# Patient Record
Sex: FEMALE | Race: WHITE | Employment: OTHER | URBAN - METROPOLITAN AREA
[De-identification: names, ages, dates, MRNs, and addresses within clinical notes are randomized per-mention and may not be internally consistent; named-entity substitution may affect disease eponyms.]

---

## 2012-04-30 LAB — COLONOSCOPY, EXTERNAL: NORMAL

## 2015-12-01 LAB — MAMMOGRAPHY, EXTERNAL: NORMAL

## 2018-06-14 ENCOUNTER — HOSPITAL ENCOUNTER (OUTPATIENT)
Dept: GENERAL RADIOLOGY | Age: 58
Discharge: HOME | End: 2018-06-14
Payer: MEDICARE

## 2018-06-14 ENCOUNTER — OFFICE VISIT (OUTPATIENT)
Dept: NEUROLOGY | Age: 58
End: 2018-06-14

## 2018-06-14 VITALS
HEIGHT: 69 IN | SYSTOLIC BLOOD PRESSURE: 136 MMHG | OXYGEN SATURATION: 98 % | DIASTOLIC BLOOD PRESSURE: 66 MMHG | BODY MASS INDEX: 34.66 KG/M2 | WEIGHT: 234 LBS | HEART RATE: 78 BPM

## 2018-06-14 DIAGNOSIS — G43.109 MIGRAINE WITH AURA AND WITHOUT STATUS MIGRAINOSUS, NOT INTRACTABLE: ICD-10-CM

## 2018-06-14 DIAGNOSIS — G44.209 TENSION VASCULAR HEADACHE: Primary | ICD-10-CM

## 2018-06-14 DIAGNOSIS — G44.209 TENSION VASCULAR HEADACHE: ICD-10-CM

## 2018-06-14 DIAGNOSIS — G56.22 ULNAR NEUROPATHY AT ELBOW OF LEFT UPPER EXTREMITY: ICD-10-CM

## 2018-06-14 DIAGNOSIS — G56.21 ULNAR NEUROPATHY AT ELBOW OF RIGHT UPPER EXTREMITY: ICD-10-CM

## 2018-06-14 DIAGNOSIS — M47.22 CERVICAL RADICULOPATHY DUE TO DEGENERATIVE JOINT DISEASE OF SPINE: ICD-10-CM

## 2018-06-14 PROCEDURE — 72052 X-RAY EXAM NECK SPINE 6/>VWS: CPT

## 2018-06-14 RX ORDER — ALPRAZOLAM 1 MG/1
1 TABLET ORAL
Refills: 0 | COMMUNITY
Start: 2018-03-09 | End: 2021-07-07

## 2018-06-14 RX ORDER — CHOLECALCIFEROL (VITAMIN D3) 125 MCG
CAPSULE ORAL DAILY
COMMUNITY
End: 2021-07-07

## 2018-06-14 RX ORDER — BUTALBITAL, ACETAMINOPHEN AND CAFFEINE 50; 325; 40 MG/1; MG/1; MG/1
2 TABLET ORAL
Qty: 50 TAB | Refills: 11 | Status: SHIPPED | OUTPATIENT
Start: 2018-06-14 | End: 2019-05-21 | Stop reason: SDUPTHER

## 2018-06-14 RX ORDER — GABAPENTIN 400 MG/1
400 CAPSULE ORAL 3 TIMES DAILY
COMMUNITY
Start: 2018-04-23 | End: 2019-10-09 | Stop reason: SDUPTHER

## 2018-06-14 RX ORDER — BUPROPION HYDROCHLORIDE 300 MG/1
TABLET ORAL
Refills: 0 | COMMUNITY
Start: 2018-04-20 | End: 2021-07-06 | Stop reason: SDUPTHER

## 2018-06-14 RX ORDER — SIMVASTATIN 20 MG/1
20 TABLET, FILM COATED ORAL
COMMUNITY
Start: 2018-04-02 | End: 2021-07-07

## 2018-06-14 RX ORDER — LANOLIN ALCOHOL/MO/W.PET/CERES
CREAM (GRAM) TOPICAL
COMMUNITY
End: 2021-07-07

## 2018-06-14 RX ORDER — PANTOPRAZOLE SODIUM 40 MG/1
40 TABLET, DELAYED RELEASE ORAL DAILY
Refills: 0 | COMMUNITY
Start: 2018-04-07 | End: 2019-10-09 | Stop reason: ALTCHOICE

## 2018-06-14 RX ORDER — METHYLPHENIDATE HYDROCHLORIDE 10 MG/1
10 TABLET ORAL DAILY
Refills: 0 | COMMUNITY
Start: 2018-05-21 | End: 2021-07-07

## 2018-06-14 RX ORDER — ESCITALOPRAM OXALATE 20 MG/1
TABLET ORAL
COMMUNITY
End: 2021-06-21 | Stop reason: SDUPTHER

## 2018-06-14 RX ORDER — ZOLPIDEM TARTRATE 10 MG/1
10 TABLET ORAL
Refills: 0 | COMMUNITY
Start: 2018-05-16 | End: 2021-07-07

## 2018-06-14 NOTE — MR AVS SNAPSHOT
[address], 
[address], [address] [facility] 
[phone] Patient: Caitie Gimenez MRN: [id number] ELISA:[id number] Visit Information Date & Time Provider Department Dept. Phone Encounter #  
 6/14/2018 10:00 AM Lisha Hernandez MD [facility] [phone] [id number] Follow-up Instructions Return in about 6 months (around 12/14/2018). Upcoming Health Maintenance Date Due Hepatitis C Screening 1960 DTaP/Tdap/Td series (1 - Tdap) 3/28/1981 PAP AKA CERVICAL CYTOLOGY 3/28/1981 BREAST CANCER SCRN MAMMOGRAM 3/28/2010 FOBT Q 1 YEAR AGE 50-75 3/28/2010 MEDICARE YEARLY EXAM 5/10/2018 Influenza Age 5 to Adult 8/1/2018 Allergies as of 6/14/2018  Review Complete On: 6/14/2018 By: Lisha Hernandez MD  
  
 Severity Noted Reaction Type Reactions Clindamycin Hcl  05/07/2013    Diarrhea Penicillins  05/07/2013    Other (comments) Body aches Prednisone  05/07/2013    Other (comments)  
 hyper Sulfa (Sulfonamide Antibiotics)  06/14/2018    Unknown (comments) Current Immunizations  Never Reviewed No immunizations on file. Not reviewed this visit You Were Diagnosed With   
  
 Codes Comments Tension vascular headache    -  Primary ICD-10-CM: O56.820 ICD-9-CM: 307.81 Cervical radiculopathy due to degenerative joint disease of spine     ICD-10-CM: M47.22 
ICD-9-CM: 721.0 Ulnar neuropathy at elbow of right upper extremity     ICD-10-CM: G56.21 ICD-9-CM: 354.2 Ulnar neuropathy at elbow of left upper extremity     ICD-10-CM: G56.22 
ICD-9-CM: 354.2 Migraine with aura and without status migrainosus, not intractable     ICD-10-CM: G43.109 ICD-9-CM: 346.00 Vitals BP Pulse Height(growth percentile) Weight(growth percentile) SpO2 BMI  
 136/66 78 5' 9\" (1.753 m) 234 lb (106.1 kg) 98% 34.56 kg/m2 Smoking Status Never Smoker BMI and BSA Data Body Mass Index Body Surface Area 34.56 kg/m 2 2.27 m 2 Preferred Pharmacy Pharmacy Name Phone [id number] [address] [phone] Your Updated Medication List  
  
   
This list is accurate as of 6/14/18 10:51 AM.  Always use your most recent med list.  
  
  
  
  
 ALPRAZolam 1 mg tablet Commonly known as:  XANAX  
2 mg daily. buPROPion  mg XL tablet Commonly known as:  WELLBUTRIN XL  
take 1 tablet by mouth once daily  
  
 butalbital-acetaminophen-caffeine -40 mg per tablet Commonly known as:  Ludwig Devoid Take 2 Tabs by mouth every eight (8) hours as needed for Pain or Headache. Indications: Migraine, TENSION-TYPE HEADACHE  
  
 escitalopram oxalate 20 mg tablet Commonly known as:  Verneta Danny escitalopram 20 mg tablet daily  
  
 ferrous sulfate 325 mg (65 mg iron) tablet Take  by mouth Daily (before breakfast). FIBER (DEXTRIN) PO Take 3 mg by mouth three (3) times daily. gabapentin 400 mg capsule Commonly known as:  NEURONTIN  
400 mg three (3) times daily. methylphenidate HCl 10 mg tablet Commonly known as:  RITALIN  
take 1 tablet by mouth twice a day  
  
 pantoprazole 40 mg tablet Commonly known as:  PROTONIX  
40 mg daily. simvastatin 20 mg tablet Commonly known as:  ZOCOR  
20 mg nightly. SUMAtriptan 100 mg tablet Commonly known as:  IMITREX Take 1 Tab by mouth as needed for Migraine. VITAMIN D3 2,000 unit Tab Generic drug:  cholecalciferol (vitamin D3) Take  by mouth daily. zolpidem 10 mg tablet Commonly known as:  AMBIEN  
10 mg nightly. Prescriptions Sent to Pharmacy Refills  
 butalbital-acetaminophen-caffeine (FIORICET, ESGIC) -40 mg per tablet 11  Sig: Take 2 Tabs by mouth every eight (8) hours as needed for Pain or Headache. Indications: Migraine, TENSION-TYPE HEADACHE Class: Normal  
 Pharmacy: [id number], [address] Ph #: [phone] Route: Oral  
  
We Performed the Following STEPHY COMPREHENSIVE PLUS PANEL [[id number] Custom] CBC WITH AUTOMATED DIFF [60710 CPT(R)] SED RATE (ESR) [id number] CPT(R)] Follow-up Instructions Return in about 6 months (around 12/14/2018). To-Do List   
 06/14/2018 Imaging:  XR SPINE CERV W OBL/FLEX/EXT MIN 6 V COMP   
  
 06/20/2018 Imaging:  MRI BRAIN W WO CONT   
  
 07/14/2018 Neurology:  [facility] LIMITED Patient Instructions Office Policies 
 
o Phone calls/patient messages: Please allow up to 24 hours for someone in the office to contact you about your call or message. Be mindful your provider may be out of the office or your message may require further review. We encourage you to use [employer] for your messages as this is a faster, more efficient way to communicate with our office 
 
o Medication Refills: 
Prescription medications require up to 48 business hours to process. We encourage you to use [employer] for your refills. For controlled medications: Please allow up to 72 business hours to process. Certain medications may require you to  a written prescription at our office. NO narcotic/controlled medications will be prescribed after 4pm Monday through Friday or on weekends 
 
o Form/Paperwork Completion: 
Please note there is a $25 fee for all paperwork completed by our providers. We ask that you allow 7-14 business days. Pre-payment is due prior to picking up/faxing the completed form. You may also download your forms to [employer] to have your doctor print off. A Healthy Lifestyle: Care Instructions Your Care Instructions A healthy lifestyle can help you feel good, stay at a healthy weight, and have plenty of energy for both work and play.  A healthy lifestyle is something you can share with your whole family. A healthy lifestyle also can lower your risk for serious health problems, such as high blood pressure, heart disease, and diabetes. You can follow a few steps listed below to improve your health and the health of your family. Follow-up care is a key part of your treatment and safety. Be sure to make and go to all appointments, and call your doctor if you are having problems. It's also a good idea to know your test results and keep a list of the medicines you take. How can you care for yourself at home? · Do not eat too much sugar, fat, or fast foods. You can still have dessert and treats now and then. The goal is moderation. · Start small to improve your eating habits. Pay attention to portion sizes, drink less juice and soda pop, and eat more fruits and vegetables. ¨ Eat a healthy amount of food. A 3-ounce serving of meat, for example, is about the size of a deck of cards. Fill the rest of your plate with vegetables and whole grains. ¨ Limit the amount of soda and sports drinks you have every day. Drink more water when you are thirsty. ¨ Eat at least 5 servings of fruits and vegetables every day. It may seem like a lot, but it is not hard to reach this goal. A serving or helping is 1 piece of fruit, 1 cup of vegetables, or 2 cups of leafy, raw vegetables. Have an apple or some carrot sticks as an afternoon snack instead of a candy bar. Try to have fruits and/or vegetables at every meal. 
· Make exercise part of your daily routine. You may want to start with simple activities, such as walking, bicycling, or slow swimming. Try to be active 30 to 60 minutes every day. You do not need to do all 30 to 60 minutes all at once. For example, you can exercise 3 times a day for 10 or 20 minutes.  Moderate exercise is safe for most people, but it is always a good idea to talk to your doctor before starting an exercise program. 
 · Keep moving. Deadra Maurice the lawn, work in the garden, or [employer]. Take the stairs instead of the elevator at work. · If you smoke, quit. People who smoke have an increased risk for heart attack, stroke, cancer, and other lung illnesses. Quitting is hard, but there are ways to boost your chance of quitting tobacco for good. ¨ Use nicotine gum, patches, or lozenges. ¨ Ask your doctor about stop-smoking programs and medicines. ¨ Keep trying. In addition to reducing your risk of diseases in the future, you will notice some benefits soon after you stop using tobacco. If you have shortness of breath or asthma symptoms, they will likely get better within a few weeks after you quit. · Limit how much alcohol you drink. Moderate amounts of alcohol (up to 2 drinks a day for men, 1 drink a day for women) are okay. But drinking too much can lead to liver problems, high blood pressure, and other health problems. Family health If you have a family, there are many things you can do together to improve your health. · Eat meals together as a family as often as possible. · Eat healthy foods. This includes fruits, vegetables, lean meats and dairy, and whole grains. · Include your family in your fitness plan. Most people think of activities such as jogging or tennis as the way to fitness, but there are many ways you and your family can be more active. Anything that makes you breathe hard and gets your heart pumping is exercise. Here are some tips: 
¨ Walk to do errands or to take your child to school or the bus. ¨ Go for a family bike ride after dinner instead of watching TV. Where can you learn more? Go to http://kimmie-subha.info/. Enter V753 in the search box to learn more about \"A Healthy Lifestyle: Care Instructions. \" Current as of: May 12, 2017 Content Version: 11.4 © 5177-1706 Healthwise, Incorporated.  Care instructions adapted under license by [address] (which disclaims liability or warranty for this information). If you have questions about a medical condition or this instruction, always ask your healthcare professional. [address] any warranty or liability for your use of this information. Introducing [facility]! Kesha Tellez introduces [employer] patient portal. Now you can access parts of your medical record, email your doctor's office, and request medication refills online. 1. In your internet browser, go to [email or web address] 2. Click on the First Time User? Click Here link in the Sign In box. You will see the New Member Sign Up page. 3. Enter your [employer] Access Code exactly as it appears below. You will not need to use this code after youve completed the sign-up process. If you do not sign up before the expiration date, you must request a new code. · [employer] Access Code: [email or web address] Expires: 9/12/2018  9:32 AM 
 
4. Enter the last four digits of your Social Security Number (xxxx) and Date of Birth (mm/dd/yyyy) as indicated and click Submit. You will be taken to the next sign-up page. 5. Create a [employer] ID. This will be your [employer] login ID and cannot be changed, so think of one that is secure and easy to remember. 6. Create a [employer] password. You can change your password at any time. 7. Enter your Password Reset Question and Answer. This can be used at a later time if you forget your password. 8. Enter your e-mail address. You will receive e-mail notification when new information is available in [address] 9. Click Sign Up. You can now view and download portions of your medical record. 10. Click the Download Summary menu link to download a portable copy of your medical information. If you have questions, please visit the Frequently Asked Questions section of the [employer] website.  Remember, [employer] is NOT to be used for urgent needs. For medical emergencies, dial 911. Now available from your iPhone and Android! Please provide this summary of care documentation to your next provider. Your primary care clinician is listed as [address] [place] [address]. If you have any questions after today's visit, please call [phone].

## 2018-06-14 NOTE — PATIENT INSTRUCTIONS
Office Policies    o Phone calls/patient messages:  Please allow up to 24 hours for someone in the office to contact you about your call or message. Be mindful your provider may be out of the office or your message may require further review. We encourage you to use [employer] for your messages as this is a faster, more efficient way to communicate with our office    o Medication Refills:  Prescription medications require up to 48 business hours to process. We encourage you to use [employer] for your refills. For controlled medications: Please allow up to 72 business hours to process. Certain medications may require you to  a written prescription at our office. NO narcotic/controlled medications will be prescribed after 4pm Monday through Friday or on weekends    o Form/Paperwork Completion:  Please note there is a $25 fee for all paperwork completed by our providers. We ask that you allow 7-14 business days. Pre-payment is due prior to picking up/faxing the completed form. You may also download your forms to [employer] to have your doctor print off. A Healthy Lifestyle: Care Instructions  Your Care Instructions    A healthy lifestyle can help you feel good, stay at a healthy weight, and have plenty of energy for both work and play. A healthy lifestyle is something you can share with your whole family. A healthy lifestyle also can lower your risk for serious health problems, such as high blood pressure, heart disease, and diabetes. You can follow a few steps listed below to improve your health and the health of your family. Follow-up care is a key part of your treatment and safety. Be sure to make and go to all appointments, and call your doctor if you are having problems. It's also a good idea to know your test results and keep a list of the medicines you take. How can you care for yourself at home? · Do not eat too much sugar, fat, or fast foods. You can still have dessert and treats now and then. The goal is moderation. · Start small to improve your eating habits. Pay attention to portion sizes, drink less juice and soda pop, and eat more fruits and vegetables. ¨ Eat a healthy amount of food. A 3-ounce serving of meat, for example, is about the size of a deck of cards. Fill the rest of your plate with vegetables and whole grains. ¨ Limit the amount of soda and sports drinks you have every day. Drink more water when you are thirsty. ¨ Eat at least 5 servings of fruits and vegetables every day. It may seem like a lot, but it is not hard to reach this goal. A serving or helping is 1 piece of fruit, 1 cup of vegetables, or 2 cups of leafy, raw vegetables. Have an apple or some carrot sticks as an afternoon snack instead of a candy bar. Try to have fruits and/or vegetables at every meal.  · Make exercise part of your daily routine. You may want to start with simple activities, such as walking, bicycling, or slow swimming. Try to be active 30 to 60 minutes every day. You do not need to do all 30 to 60 minutes all at once. For example, you can exercise 3 times a day for 10 or 20 minutes. Moderate exercise is safe for most people, but it is always a good idea to talk to your doctor before starting an exercise program.  · Keep moving. [employer] the lawn, work in the garden, or [employer]. Take the stairs instead of the elevator at work. · If you smoke, quit. People who smoke have an increased risk for heart attack, stroke, cancer, and other lung illnesses. Quitting is hard, but there are ways to boost your chance of quitting tobacco for good. ¨ Use nicotine gum, patches, or lozenges. ¨ Ask your doctor about stop-smoking programs and medicines. ¨ Keep trying. In addition to reducing your risk of diseases in the future, you will notice some benefits soon after you stop using tobacco. If you have shortness of breath or asthma symptoms, they will likely get better within a few weeks after you quit.   · Limit how much alcohol you drink. Moderate amounts of alcohol (up to 2 drinks a day for men, 1 drink a day for women) are okay. But drinking too much can lead to liver problems, high blood pressure, and other health problems. Family health  If you have a family, there are many things you can do together to improve your health. · Eat meals together as a family as often as possible. · Eat healthy foods. This includes fruits, vegetables, lean meats and dairy, and whole grains. · Include your family in your fitness plan. Most people think of activities such as jogging or tennis as the way to fitness, but there are many ways you and your family can be more active. Anything that makes you breathe hard and gets your heart pumping is exercise. Here are some tips:  ¨ Walk to do errands or to take your child to school or the bus. ¨ Go for a family bike ride after dinner instead of watching TV. Where can you learn more? Go to http://kimmie-subha.info/. Enter P322 in the search box to learn more about \"A Healthy Lifestyle: Care Instructions. \"  Current as of: May 12, 2017  Content Version: 11.4  © 3671-7999 Healthwise, Incorporated. Care instructions adapted under license by [employer] (which disclaims liability or warranty for this information). If you have questions about a medical condition or this instruction, always ask your healthcare professional. [address] any warranty or liability for your use of this information.

## 2018-06-14 NOTE — PROGRESS NOTES
Consult  REFERRED BY:  Jose Elias Sweet MD    CHIEF COMPLAINT: New severe headache      Subjective:     Jerris Sever is a 62 y.o. right-handed  female we are asked to evaluate as a new patient to us at the referral of Dr. Ivonne Ballard for new problem of new type of headache in the bifrontal head region is becoming more frequent, almost on every other day basis, that is associated with increasing numbness radiating into her hands, mainly along the little finger and palm side of the hand, right side greater than left. She also has some radiating pain into the left upper arm that she thinks is coming from her neck. She supposedly has a known cervical disc disease in the neck. She denies any fever, meningismus, trauma, increased stress or depression or tension as a cause of her headaches. She is not aware of any precipitating or relieving factors for the headache. She is already on gabapentin 400 mg 3 times a day, Ritalin 10 mg twice a day, Xanax 1 mg twice a day, Wellbutrin 300 mg once a day, Lexapro 20 mg a day because of her depression. She is retired on disability because of her depression. She had a previous lumbar laminectomy, and bilateral carpal tunnel syndrome surgery, and trigger finger of her left thumb that was fixed in 2013. She had gallbladder surgery in 2011. She had a previous history of migraine years ago, and had a normal MRI of the brain that was 10-15 years ago and a normal CT then. She took Topamax then, but is looking for medication that she can take on a as needed basis now. She has tried Imitrex in the past with partial relief but that does not seem to stop all the headaches now. She is concerned that there is something wrong. She has had no real cognitive issues, no visual loss, no other cranial nerve problems, but the headaches are getting very bad and she has difficulty doing her daily function with these headaches. She is concerned she may have a tumor.   She has not had any clear gait instability. She did have a recent fall when she slipped and hit her left ear, and has a minor laceration there. She does not have any nausea vomiting and the pressure is described as a severe pressure sensation across the frontal head region. She has some chronic discomfort in the posterior craniocervical junction from her neck problems in addition. Patient has chronic numbness in her left foot and loss of ankle jerks because of her back operation. Past Medical History:   Diagnosis Date    Chronic mental illness     Migraine       Past Surgical History:   Procedure Laterality Date    HX CHOLECYSTECTOMY  2011    HX ORTHOPAEDIC  2014, 1989, 1991    Lumbar Laminectomy    HX ORTHOPAEDIC  2011    CTS both hands    HX ORTHOPAEDIC  2013    left thumb      Family History   Problem Relation Age of Onset    Cancer Mother      lung    Dementia Father     Heart Disease Brother       Social History   Substance Use Topics    Smoking status: Never Smoker    Smokeless tobacco: Never Used    Alcohol use No         Current Outpatient Prescriptions:     ALPRAZolam (XANAX) 1 mg tablet, 2 mg daily. , Disp: , Rfl: 0    buPROPion XL (WELLBUTRIN XL) 300 mg XL tablet, take 1 tablet by mouth once daily, Disp: , Rfl: 0    escitalopram oxalate (LEXAPRO) 20 mg tablet, escitalopram 20 mg tablet daily, Disp: , Rfl:     gabapentin (NEURONTIN) 400 mg capsule, 400 mg three (3) times daily. , Disp: , Rfl:     methylphenidate HCl (RITALIN) 10 mg tablet, take 1 tablet by mouth twice a day, Disp: , Rfl: 0    pantoprazole (PROTONIX) 40 mg tablet, 40 mg daily. , Disp: , Rfl: 0    simvastatin (ZOCOR) 20 mg tablet, 20 mg nightly., Disp: , Rfl:     zolpidem (AMBIEN) 10 mg tablet, 10 mg nightly., Disp: , Rfl: 0    ferrous sulfate 325 mg (65 mg iron) tablet, Take  by mouth Daily (before breakfast). , Disp: , Rfl:     FIBER, DEXTRIN, PO, Take 3 mg by mouth three (3) times daily. , Disp: , Rfl:     cholecalciferol, vitamin D3, (VITAMIN D3) 2,000 unit tab, Take  by mouth daily. , Disp: , Rfl:     butalbital-acetaminophen-caffeine (FIORICET, ESGIC) -40 mg per tablet, Take 2 Tabs by mouth every eight (8) hours as needed for Pain or Headache. Indications: Migraine, TENSION-TYPE HEADACHE, Disp: 50 Tab, Rfl: 11    SUMAtriptan (IMITREX) 100 mg tablet, Take 1 Tab by mouth as needed for Migraine. , Disp: 9 Tab, Rfl: 5        Allergies   Allergen Reactions    Clindamycin Hcl Diarrhea    Penicillins Other (comments)     Body aches    Prednisone Other (comments)     hyper    Sulfa (Sulfonamide Antibiotics) Unknown (comments)        Review of Systems:  A comprehensive review of systems was negative except for: Musculoskeletal: positive for myalgias, arthralgias and stiff joints  Neurological: positive for headaches  Behvioral/Psych: positive for anxiety and depression   Vitals:    06/14/18 1021   BP: 136/66   Pulse: 78   SpO2: 98%   Weight: 234 lb (106.1 kg)   Height: 5' 9\" (1.753 m)     Objective:     I      NEUROLOGICAL EXAM:    Appearance: The patient is well developed, well nourished, provides a coherent history and is in no acute distress. Mental Status: Oriented to time, place and person, and the president, cognitive function is normal and speech is fluent and no aphasia or dysarthria. Mood and affect appropriate but appears depressed. Cranial Nerves:   Intact visual fields. Fundi are benign. EDIL, EOM's full, no nystagmus, no ptosis. Facial sensation is normal. Corneal reflexes are not tested. Facial movement is symmetric. Hearing is normal bilaterally. Palate is midline with normal sternocleidomastoid and trapezius muscles are normal. Tongue is midline. Neck without meningismus or bruits  Temporal arteries are not tender or enlarged   Motor:  5/5 strength in upper and lower proximal and distal muscles. Normal bulk and tone. No fasciculations.    Reflexes:   Deep tendon reflexes 2+/4 and symmetrical, except for absent left ankle jerks because of her previous back operation. No babinski or clonus present  Patient has a Tinel's over both ulnar nerves at the elbows bilaterally suggesting tardy ulnar palsy's bilaterally. Sensory:   Normal to touch, pinprick and vibration and temperature. DSS is intact   Gait:  Normal gait. Tremor:   No tremor noted. Cerebellar:  No cerebellar signs present. Neurovascular:  Normal heart sounds and regular rhythm, peripheral pulses decreased, and no carotid bruits. Assessment:       ICD-10-CM ICD-9-CM    1. Tension vascular headache G44.209 307.81 ALPRAZolam (XANAX) 1 mg tablet      buPROPion XL (WELLBUTRIN XL) 300 mg XL tablet      escitalopram oxalate (LEXAPRO) 20 mg tablet      gabapentin (NEURONTIN) 400 mg capsule      methylphenidate HCl (RITALIN) 10 mg tablet      pantoprazole (PROTONIX) 40 mg tablet      simvastatin (ZOCOR) 20 mg tablet      zolpidem (AMBIEN) 10 mg tablet      ferrous sulfate 325 mg (65 mg iron) tablet      FIBER, DEXTRIN, PO      cholecalciferol, vitamin D3, (VITAMIN D3) 2,000 unit tab      MRI BRAIN W WO CONT      SED RATE (ESR)      STEPHY COMPREHENSIVE PLUS PANEL      CBC WITH AUTOMATED DIFF      XR SPINE CERV W OBL/FLEX/EXT MIN 6 V COMP      butalbital-acetaminophen-caffeine (FIORICET, ESGIC) -40 mg per tablet      EMG LIMITED   2.  Cervical radiculopathy due to degenerative joint disease of spine M47.22 721.0 ALPRAZolam (XANAX) 1 mg tablet      buPROPion XL (WELLBUTRIN XL) 300 mg XL tablet      escitalopram oxalate (LEXAPRO) 20 mg tablet      gabapentin (NEURONTIN) 400 mg capsule      methylphenidate HCl (RITALIN) 10 mg tablet      pantoprazole (PROTONIX) 40 mg tablet      simvastatin (ZOCOR) 20 mg tablet      zolpidem (AMBIEN) 10 mg tablet      ferrous sulfate 325 mg (65 mg iron) tablet      FIBER, DEXTRIN, PO      cholecalciferol, vitamin D3, (VITAMIN D3) 2,000 unit tab      MRI BRAIN W WO CONT      SED RATE (ESR)      STEPHY COMPREHENSIVE PLUS PANEL CBC WITH AUTOMATED DIFF      XR SPINE CERV W OBL/FLEX/EXT MIN 6 V COMP      butalbital-acetaminophen-caffeine (FIORICET, ESGIC) -40 mg per tablet      EMG LIMITED   3. Ulnar neuropathy at elbow of right upper extremity G56.21 354.2 ALPRAZolam (XANAX) 1 mg tablet      buPROPion XL (WELLBUTRIN XL) 300 mg XL tablet      escitalopram oxalate (LEXAPRO) 20 mg tablet      gabapentin (NEURONTIN) 400 mg capsule      methylphenidate HCl (RITALIN) 10 mg tablet      pantoprazole (PROTONIX) 40 mg tablet      simvastatin (ZOCOR) 20 mg tablet      zolpidem (AMBIEN) 10 mg tablet      ferrous sulfate 325 mg (65 mg iron) tablet      FIBER, DEXTRIN, PO      cholecalciferol, vitamin D3, (VITAMIN D3) 2,000 unit tab      MRI BRAIN W WO CONT      SED RATE (ESR)      STEPHY COMPREHENSIVE PLUS PANEL      CBC WITH AUTOMATED DIFF      XR SPINE CERV W OBL/FLEX/EXT MIN 6 V COMP      butalbital-acetaminophen-caffeine (FIORICET, ESGIC) -40 mg per tablet      EMG LIMITED   4. Ulnar neuropathy at elbow of left upper extremity G56.22 354.2 ALPRAZolam (XANAX) 1 mg tablet      buPROPion XL (WELLBUTRIN XL) 300 mg XL tablet      escitalopram oxalate (LEXAPRO) 20 mg tablet      gabapentin (NEURONTIN) 400 mg capsule      methylphenidate HCl (RITALIN) 10 mg tablet      pantoprazole (PROTONIX) 40 mg tablet      simvastatin (ZOCOR) 20 mg tablet      zolpidem (AMBIEN) 10 mg tablet      ferrous sulfate 325 mg (65 mg iron) tablet      FIBER, DEXTRIN, PO      cholecalciferol, vitamin D3, (VITAMIN D3) 2,000 unit tab      MRI BRAIN W WO CONT      SED RATE (ESR)      STEPHY COMPREHENSIVE PLUS PANEL      CBC WITH AUTOMATED DIFF      XR SPINE CERV W OBL/FLEX/EXT MIN 6 V COMP      butalbital-acetaminophen-caffeine (FIORICET, ESGIC) -40 mg per tablet      EMG LIMITED   5.  Migraine with aura and without status migrainosus, not intractable G43.109 346.00 ALPRAZolam (XANAX) 1 mg tablet      buPROPion XL (WELLBUTRIN XL) 300 mg XL tablet      escitalopram oxalate (LEXAPRO) 20 mg tablet      gabapentin (NEURONTIN) 400 mg capsule      methylphenidate HCl (RITALIN) 10 mg tablet      pantoprazole (PROTONIX) 40 mg tablet      simvastatin (ZOCOR) 20 mg tablet      zolpidem (AMBIEN) 10 mg tablet      ferrous sulfate 325 mg (65 mg iron) tablet      FIBER, DEXTRIN, PO      cholecalciferol, vitamin D3, (VITAMIN D3) 2,000 unit tab      MRI BRAIN W WO CONT      SED RATE (ESR)      STEPHY COMPREHENSIVE PLUS PANEL      CBC WITH AUTOMATED DIFF      XR SPINE CERV W OBL/FLEX/EXT MIN 6 V COMP      butalbital-acetaminophen-caffeine (FIORICET, ESGIC) -40 mg per tablet      EMG LIMITED     Active Problems:    * No active hospital problems. *      Plan:     Patient with new onset of headaches in the bifrontal head region of unclear etiology, they are becoming increasingly severe, more frequent, and incapacitating and patient is concerned about a tumor  We will give her Fioricet to use as needed for the headache, she will get an MRI scan, and STEPHY and a sed rate to rule out temporal arteritis or other causes of her headaches  Further treatment evaluation would depend on the results of her tests and clinical course  She does not want to try preventive therapy at this time. Because of her numbness in both hands in the ulnar distribution she will get an EMG study to rule out tardy ulnar palsy, because she seems to have that on exam  She will also get cervical spine x-rays to make sure the cervical spine is not showing any instability and she may need an MRI that eventually to evaluate her known cervical disc disease because of her focal neurologic deficits and increasing arm numbness. She may need to go back to her neurosurgeon that she saw last year for that. She will check my chart for results of her tests, or call us, follow-up will be arranged in 3-6 months time or earlier as needed.   One hour spent reviewing her history, reviewing her old records from her previous neurologist 8 years ago, and discussing her diagnosis prognosis and further treatment evaluation with her in detail. Signed By: Soumya Snyder MD     June 14, 2018       CC: Jose Elias Sweet MD  FAX: [phone]    This note will not be viewable in [address]

## 2018-06-14 NOTE — LETTER
6/14/2018 1:52 PM 
 
Patient:  Temple Hodgkins YOB: 1960 Date of Visit: 6/14/2018 Dear No Recipients: Thank you for referring Ms. Temple Hodgkins to me for evaluation/treatment. Below are the relevant portions of my assessment and plan of care. Consult REFERRED BY: 
Tiera Busch MD 
 
CHIEF COMPLAINT: New severe headache Subjective:  
 
Temple Hodgkins is a 62 y.o. right-handed  female we are asked to evaluate as a new patient to us at the referral of Dr. Charbel Brunson for new problem of new type of headache in the bifrontal head region is becoming more frequent, almost on every other day basis, that is associated with increasing numbness radiating into her hands, mainly along the little finger and palm side of the hand, right side greater than left. She also has some radiating pain into the left upper arm that she thinks is coming from her neck. She supposedly has a known cervical disc disease in the neck. She denies any fever, meningismus, trauma, increased stress or depression or tension as a cause of her headaches. She is not aware of any precipitating or relieving factors for the headache. She is already on gabapentin 400 mg 3 times a day, Ritalin 10 mg twice a day, Xanax 1 mg twice a day, Wellbutrin 300 mg once a day, Lexapro 20 mg a day because of her depression. She is retired on disability because of her depression. She had a previous lumbar laminectomy, and bilateral carpal tunnel syndrome surgery, and trigger finger of her left thumb that was fixed in 2013. She had gallbladder surgery in 2011. She had a previous history of migraine years ago, and had a normal MRI of the brain that was 10-15 years ago and a normal CT then. She took Topamax then, but is looking for medication that she can take on a as needed basis now.   She has tried Imitrex in the past with partial relief but that does not seem to stop all the headaches now. She is concerned that there is something wrong. She has had no real cognitive issues, no visual loss, no other cranial nerve problems, but the headaches are getting very bad and she has difficulty doing her daily function with these headaches. She is concerned she may have a tumor. She has not had any clear gait instability. She did have a recent fall when she slipped and hit her left ear, and has a minor laceration there. She does not have any nausea vomiting and the pressure is described as a severe pressure sensation across the frontal head region. She has some chronic discomfort in the posterior craniocervical junction from her neck problems in addition. Patient has chronic numbness in her left foot and loss of ankle jerks because of her back operation. Past Medical History:  
Diagnosis Date  Chronic mental illness  Migraine Past Surgical History:  
Procedure Laterality Date  HX CHOLECYSTECTOMY  2011  HX ORTHOPAEDIC  2014, [address] Lumbar Laminectomy [facility] ORTHOPAEDIC  2011 CTS both hands  HX ORTHOPAEDIC  2013  
 left thumb Family History Problem Relation Age of Onset  Cancer Mother   
  lung  Dementia Father  Heart Disease Brother Social History Substance Use Topics  Smoking status: Never Smoker  Smokeless tobacco: Never Used  Alcohol use No  
   
 
Current Outpatient Prescriptions:  
  ALPRAZolam (XANAX) 1 mg tablet, 2 mg daily. , Disp: , Rfl: 0 
  buPROPion XL (WELLBUTRIN XL) 300 mg XL tablet, take 1 tablet by mouth once daily, Disp: , Rfl: 0 
  escitalopram oxalate (LEXAPRO) 20 mg tablet, escitalopram 20 mg tablet daily, Disp: , Rfl:  
  gabapentin (NEURONTIN) 400 mg capsule, 400 mg three (3) times daily. , Disp: , Rfl:  
  methylphenidate HCl (RITALIN) 10 mg tablet, take 1 tablet by mouth twice a day, Disp: , Rfl: 0 
  pantoprazole (PROTONIX) 40 mg tablet, 40 mg daily. , Disp: , Rfl: 0 
   simvastatin (ZOCOR) 20 mg tablet, 20 mg nightly., Disp: , Rfl:  
  zolpidem (AMBIEN) 10 mg tablet, 10 mg nightly., Disp: , Rfl: 0 
  ferrous sulfate 325 mg (65 mg iron) tablet, Take  by mouth Daily (before breakfast). , Disp: , Rfl:  
  FIBER, DEXTRIN, PO, Take 3 mg by mouth three (3) times daily. , Disp: , Rfl:  
  cholecalciferol, vitamin D3, (VITAMIN D3) 2,000 unit tab, Take  by mouth daily. , Disp: , Rfl:  
  butalbital-acetaminophen-caffeine (FIORICET, ESGIC) -40 mg per tablet, Take 2 Tabs by mouth every eight (8) hours as needed for Pain or Headache. Indications: Migraine, TENSION-TYPE HEADACHE, Disp: 50 Tab, Rfl: 11 
  SUMAtriptan (IMITREX) 100 mg tablet, Take 1 Tab by mouth as needed for Migraine. , Disp: 9 Tab, Rfl: 5 Allergies Allergen Reactions  Clindamycin Hcl Diarrhea  Penicillins Other (comments) Body aches  Prednisone Other (comments)  
  hyper  Sulfa (Sulfonamide Antibiotics) Unknown (comments) Review of Systems: A comprehensive review of systems was negative except for: Musculoskeletal: positive for myalgias, arthralgias and stiff joints Neurological: positive for headaches Behvioral/Psych: positive for anxiety and depression Vitals:  
 06/14/18 1021 BP: 136/66 Pulse: 78 SpO2: 98% Weight: 234 lb (106.1 kg) Height: 5' 9\" (1.753 m) Objective: I 
 
 
NEUROLOGICAL EXAM: 
 
Appearance: The patient is well developed, well nourished, provides a coherent history and is in no acute distress. Mental Status: Oriented to time, place and person, and the president, cognitive function is normal and speech is fluent and no aphasia or dysarthria. Mood and affect appropriate but appears depressed. Cranial Nerves:   Intact visual fields. Fundi are benign. EDIL, EOM's full, no nystagmus, no ptosis. Facial sensation is normal. Corneal reflexes are not tested. Facial movement is symmetric.  Hearing is normal bilaterally. Palate is midline with normal sternocleidomastoid and trapezius muscles are normal. Tongue is midline. Neck without meningismus or bruits Temporal arteries are not tender or enlarged Motor:  5/5 strength in upper and lower proximal and distal muscles. Normal bulk and tone. No fasciculations. Reflexes:   Deep tendon reflexes 2+/4 and symmetrical, except for absent left ankle jerks because of her previous back operation. No babinski or clonus present Patient has a Tinel's over both ulnar nerves at the elbows bilaterally suggesting tardy ulnar palsy's bilaterally. Sensory:   Normal to touch, pinprick and vibration and temperature. DSS is intact Gait:  Normal gait. Tremor:   No tremor noted. Cerebellar:  No cerebellar signs present. Neurovascular:  Normal heart sounds and regular rhythm, peripheral pulses decreased, and no carotid bruits. Assessment: ICD-10-CM ICD-9-CM 1. Tension vascular headache G44.209 307.81 ALPRAZolam (XANAX) 1 mg tablet buPROPion XL (WELLBUTRIN XL) 300 mg XL tablet  
   escitalopram oxalate (LEXAPRO) 20 mg tablet  
   gabapentin (NEURONTIN) 400 mg capsule  
   methylphenidate HCl (RITALIN) 10 mg tablet  
   pantoprazole (PROTONIX) 40 mg tablet  
   simvastatin (ZOCOR) 20 mg tablet  
   zolpidem (AMBIEN) 10 mg tablet  
   ferrous sulfate 325 mg (65 mg iron) tablet FIBER, DEXTRIN, PO  
   cholecalciferol, vitamin D3, (VITAMIN D3) 2,000 unit tab MRI BRAIN W WO CONT  
   SED RATE (ESR) STEPHY COMPREHENSIVE PLUS PANEL  
   CBC WITH AUTOMATED DIFF  
   XR SPINE CERV W OBL/FLEX/EXT MIN 6 V COMP  
   butalbital-acetaminophen-caffeine (FIORICET, ESGIC) -40 mg per tablet EMG LIMITED 2. Cervical radiculopathy due to degenerative joint disease of spine M47.22 721.0 ALPRAZolam (XANAX) 1 mg tablet buPROPion XL (WELLBUTRIN XL) 300 mg XL tablet  
   escitalopram oxalate (LEXAPRO) 20 mg tablet gabapentin (NEURONTIN) 400 mg capsule  
   methylphenidate HCl (RITALIN) 10 mg tablet  
   pantoprazole (PROTONIX) 40 mg tablet  
   simvastatin (ZOCOR) 20 mg tablet  
   zolpidem (AMBIEN) 10 mg tablet  
   ferrous sulfate 325 mg (65 mg iron) tablet FIBER, DEXTRIN, PO  
   cholecalciferol, vitamin D3, (VITAMIN D3) 2,000 unit tab MRI BRAIN W WO CONT  
   SED RATE (ESR) STEPHY COMPREHENSIVE PLUS PANEL  
   CBC WITH AUTOMATED DIFF  
   XR SPINE CERV W OBL/FLEX/EXT MIN 6 V COMP  
   butalbital-acetaminophen-caffeine (FIORICET, ESGIC) -40 mg per tablet EMG LIMITED 3. Ulnar neuropathy at elbow of right upper extremity G56.21 354.2 ALPRAZolam (XANAX) 1 mg tablet buPROPion XL (WELLBUTRIN XL) 300 mg XL tablet  
   escitalopram oxalate (LEXAPRO) 20 mg tablet  
   gabapentin (NEURONTIN) 400 mg capsule  
   methylphenidate HCl (RITALIN) 10 mg tablet  
   pantoprazole (PROTONIX) 40 mg tablet  
   simvastatin (ZOCOR) 20 mg tablet  
   zolpidem (AMBIEN) 10 mg tablet  
   ferrous sulfate 325 mg (65 mg iron) tablet FIBER, DEXTRIN, PO  
   cholecalciferol, vitamin D3, (VITAMIN D3) 2,000 unit tab MRI BRAIN W WO CONT  
   SED RATE (ESR) STEPHY COMPREHENSIVE PLUS PANEL  
   CBC WITH AUTOMATED DIFF  
   XR SPINE CERV W OBL/FLEX/EXT MIN 6 V COMP  
   butalbital-acetaminophen-caffeine (FIORICET, ESGIC) -40 mg per tablet EMG LIMITED 4. Ulnar neuropathy at elbow of left upper extremity G56.22 354.2 ALPRAZolam (XANAX) 1 mg tablet buPROPion XL (WELLBUTRIN XL) 300 mg XL tablet  
   escitalopram oxalate (LEXAPRO) 20 mg tablet  
   gabapentin (NEURONTIN) 400 mg capsule  
   methylphenidate HCl (RITALIN) 10 mg tablet  
   pantoprazole (PROTONIX) 40 mg tablet  
   simvastatin (ZOCOR) 20 mg tablet  
   zolpidem (AMBIEN) 10 mg tablet  
   ferrous sulfate 325 mg (65 mg iron) tablet FIBER, DEXTRIN, PO  
   cholecalciferol, vitamin D3, (VITAMIN D3) 2,000 unit tab MRI BRAIN W WO CONT  
   SED RATE (ESR) STEPHY COMPREHENSIVE PLUS PANEL  
   CBC WITH AUTOMATED DIFF  
   XR SPINE CERV W OBL/FLEX/EXT MIN 6 V COMP  
   butalbital-acetaminophen-caffeine (FIORICET, ESGIC) -40 mg per tablet EMG LIMITED 5. Migraine with aura and without status migrainosus, not intractable G43.109 346.00 ALPRAZolam (XANAX) 1 mg tablet buPROPion XL (WELLBUTRIN XL) 300 mg XL tablet  
   escitalopram oxalate (LEXAPRO) 20 mg tablet  
   gabapentin (NEURONTIN) 400 mg capsule  
   methylphenidate HCl (RITALIN) 10 mg tablet  
   pantoprazole (PROTONIX) 40 mg tablet  
   simvastatin (ZOCOR) 20 mg tablet  
   zolpidem (AMBIEN) 10 mg tablet  
   ferrous sulfate 325 mg (65 mg iron) tablet FIBER, DEXTRIN, PO  
   cholecalciferol, vitamin D3, (VITAMIN D3) 2,000 unit tab MRI BRAIN W WO CONT  
   SED RATE (ESR) STEPHY COMPREHENSIVE PLUS PANEL  
   CBC WITH AUTOMATED DIFF  
   XR SPINE CERV W OBL/FLEX/EXT MIN 6 V COMP  
   butalbital-acetaminophen-caffeine (FIORICET, ESGIC) -40 mg per tablet EMG LIMITED Active Problems: * No active hospital problems. * 
 
 
Plan:  
 
Patient with new onset of headaches in the bifrontal head region of unclear etiology, they are becoming increasingly severe, more frequent, and incapacitating and patient is concerned about a tumor We will give her Fioricet to use as needed for the headache, she will get an MRI scan, and STEPHY and a sed rate to rule out temporal arteritis or other causes of her headaches Further treatment evaluation would depend on the results of her tests and clinical course She does not want to try preventive therapy at this time.  
Because of her numbness in both hands in the ulnar distribution she will get an EMG study to rule out tardy ulnar palsy, because she seems to have that on exam 
She will also get cervical spine x-rays to make sure the cervical spine is not showing any instability and she may need an MRI that eventually to evaluate her known cervical disc disease because of her focal neurologic deficits and increasing arm numbness. She may need to go back to her neurosurgeon that she saw last year for that. She will check my chart for results of her tests, or call us, follow-up will be arranged in 3-6 months time or earlier as needed. One hour spent reviewing her history, reviewing her old records from her previous neurologist 10 years ago, and discussing her diagnosis prognosis and further treatment evaluation with her in detail. Signed By: Laci Sauer MD   
 June 14, 2018 CC: Dave Kent MD 
FAX: [phone] This note will not be viewable in [address] If you have questions, please do not hesitate to call me. I look forward to following Ms. Amaya along with you. Sincerely, Laci Sauer MD

## 2018-06-15 ENCOUNTER — TELEPHONE (OUTPATIENT)
Dept: NEUROLOGY | Age: 58
End: 2018-06-15

## 2018-06-15 LAB
BASOPHILS # BLD AUTO: 0 X10E3/UL (ref 0–0.2)
BASOPHILS NFR BLD AUTO: 0 %
CENTROMERE B AB SER-ACNC: <0.2 AI (ref 0–0.9)
CHROMATIN AB SERPL-ACNC: <0.2 AI (ref 0–0.9)
DSDNA AB SER-ACNC: <1 IU/ML (ref 0–9)
ENA JO1 AB SER-ACNC: <0.2 AI (ref 0–0.9)
ENA RNP AB SER-ACNC: <0.2 AI (ref 0–0.9)
ENA SCL70 AB SER-ACNC: <0.2 AI (ref 0–0.9)
ENA SM AB SER-ACNC: <0.2 AI (ref 0–0.9)
ENA SM+RNP AB SER-ACNC: <0.2 AI (ref 0–0.9)
ENA SS-A AB SER-ACNC: <0.2 AI (ref 0–0.9)
ENA SS-B AB SER-ACNC: <0.2 AI (ref 0–0.9)
EOSINOPHIL # BLD AUTO: 0.1 X10E3/UL (ref 0–0.4)
EOSINOPHIL NFR BLD AUTO: 2 %
ERYTHROCYTE [DISTWIDTH] IN BLOOD BY AUTOMATED COUNT: 13.3 % (ref 12.3–15.4)
ERYTHROCYTE [SEDIMENTATION RATE] IN BLOOD BY WESTERGREN METHOD: 11 MM/HR (ref 0–40)
HCT VFR BLD AUTO: 40.7 % (ref 34–46.6)
HGB BLD-MCNC: 13 G/DL (ref 11.1–15.9)
IMM GRANULOCYTES # BLD: 0 X10E3/UL (ref 0–0.1)
IMM GRANULOCYTES NFR BLD: 0 %
LYMPHOCYTES # BLD AUTO: 1.4 X10E3/UL (ref 0.7–3.1)
LYMPHOCYTES NFR BLD AUTO: 25 %
MCH RBC QN AUTO: 29.8 PG (ref 26.6–33)
MCHC RBC AUTO-ENTMCNC: 31.9 G/DL (ref 31.5–35.7)
MCV RBC AUTO: 93 FL (ref 79–97)
MONOCYTES # BLD AUTO: 0.5 X10E3/UL (ref 0.1–0.9)
MONOCYTES NFR BLD AUTO: 8 %
NEUTROPHILS # BLD AUTO: 3.8 X10E3/UL (ref 1.4–7)
NEUTROPHILS NFR BLD AUTO: 65 %
PLATELET # BLD AUTO: 330 X10E3/UL (ref 150–379)
RBC # BLD AUTO: 4.36 X10E6/UL (ref 3.77–5.28)
RIBOSOMAL P AB SER-ACNC: <0.2 AI (ref 0–0.9)
SEE BELOW:, 164879: NORMAL
WBC # BLD AUTO: 5.8 X10E3/UL (ref 3.4–10.8)

## 2018-06-15 NOTE — TELEPHONE ENCOUNTER
Patient remembered that her Xrays from last year was ordered by Florence Virgen. Telma Parth office# [phone] fax# [phone]. Pt would like for thoses Xrays to be compared to the ones that  ordered for her yesterday. She will be faxing over a medical release form.

## 2018-07-16 ENCOUNTER — HOSPITAL ENCOUNTER (OUTPATIENT)
Dept: MRI IMAGING | Age: 58
Discharge: HOME | End: 2018-07-16
Attending: PSYCHIATRY & NEUROLOGY
Payer: MEDICARE

## 2018-07-16 DIAGNOSIS — M47.22 CERVICAL RADICULOPATHY DUE TO DEGENERATIVE JOINT DISEASE OF SPINE: ICD-10-CM

## 2018-07-16 DIAGNOSIS — G43.109 MIGRAINE WITH AURA AND WITHOUT STATUS MIGRAINOSUS, NOT INTRACTABLE: ICD-10-CM

## 2018-07-16 DIAGNOSIS — G56.21 ULNAR NEUROPATHY AT ELBOW OF RIGHT UPPER EXTREMITY: ICD-10-CM

## 2018-07-16 DIAGNOSIS — G44.209 TENSION VASCULAR HEADACHE: ICD-10-CM

## 2018-07-16 DIAGNOSIS — G56.22 ULNAR NEUROPATHY AT ELBOW OF LEFT UPPER EXTREMITY: ICD-10-CM

## 2018-07-16 PROCEDURE — 74011250636 HC RX REV CODE- 250/636: Performed by: PSYCHIATRY & NEUROLOGY

## 2018-07-16 PROCEDURE — 70553 MRI BRAIN STEM W/O & W/DYE: CPT

## 2018-07-16 PROCEDURE — A9575 INJ GADOTERATE MEGLUMI 0.1ML: HCPCS | Performed by: PSYCHIATRY & NEUROLOGY

## 2018-07-16 RX ORDER — GADOTERATE MEGLUMINE 376.9 MG/ML
20 INJECTION INTRAVENOUS
Status: COMPLETED | OUTPATIENT
Start: 2018-07-16 | End: 2018-07-16

## 2018-07-16 RX ADMIN — GADOTERATE MEGLUMINE 20 ML: 376.9 INJECTION INTRAVENOUS at 14:31

## 2018-07-20 LAB — PAP SMEAR, EXTERNAL: NORMAL

## 2018-07-25 ENCOUNTER — OFFICE VISIT (OUTPATIENT)
Dept: NEUROLOGY | Age: 58
End: 2018-07-25

## 2018-07-25 VITALS — HEART RATE: 88 BPM | OXYGEN SATURATION: 98 % | SYSTOLIC BLOOD PRESSURE: 117 MMHG | DIASTOLIC BLOOD PRESSURE: 74 MMHG

## 2018-07-25 DIAGNOSIS — M79.7 FIBROMYALGIA: ICD-10-CM

## 2018-07-25 DIAGNOSIS — M47.22 CERVICAL RADICULOPATHY DUE TO DEGENERATIVE JOINT DISEASE OF SPINE: Primary | ICD-10-CM

## 2018-07-25 DIAGNOSIS — G56.21 ULNAR NEUROPATHY AT ELBOW OF RIGHT UPPER EXTREMITY: ICD-10-CM

## 2018-07-25 DIAGNOSIS — G56.22 ULNAR NEUROPATHY AT ELBOW OF LEFT UPPER EXTREMITY: ICD-10-CM

## 2018-07-25 NOTE — PROCEDURES
[facility] at [facility]        Tel: [phone] ? Fax: [phone]    ELECTRODIAGNOSTIC REPORT      Test Date:  2018    Patient: Mony Samuels : 1960 Physician: Jose A Billingsley M.D. Sex: Female  < Ref Phys: Jose A Billingsley M.D. 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 She has no Babinski sign or clonus present. Cranial nerves II through XII intact, gait and station is essentially normal.  Mental status is normal.  Patient does have slight pain on range of motion of her neck, and her lumbar spine. Exam: This study shows electrophysiologic evidence of an essentially normal EMG and nerve conduction velocity study of both upper extremities, showing no clear evidence of cervical radiculopathy, entrapment neuropathy, or myopathy or other neuromuscular disease. Clinical correlation recommended, and correlation with imaging modalities would also be of further diagnostic benefit if clinically indicated. Repeat studies in 6-12 months time may also be of further diagnostic benefit if clinically indicated. EMG & NCV Findings:  Evaluation of the left median motor and the right median motor nerves showed normal distal onset latency (L4.1, R4.1 ms), normal amplitude (L6.2, R6.5 mV), and normal conduction velocity (Elbow-Wrist, L73, R63 m/s).   The left ulnar motor and the right ulnar motor nerves showed normal distal onset latency (L2.3, R2.3 ms), normal amplitude (L9.3, R10.1 mV), normal conduction velocity (B Elbow-Wrist, L65, R57 m/s), and normal conduction velocity (A Elbow-B Elbow, L62, R53 m/s). The left median sensory, the right median sensory, the right radial sensory, the left ulnar sensory, and the right ulnar sensory nerves showed normal distal peak latency (L2.9, R3.2, R2.1, L3.3, R2.9 ms) and normal amplitude (L44.9, R46.0, R18.7, L40.8, R36.6 µV). Left vs. Right side comparison data for the median motor nerve indicates abnormal L-R velocity difference (Elbow-Wrist, 10 m/s). All remaining left vs. right side differences were within normal limits. All F Wave latencies were within normal limits.                 __________________  Sonali Bird M.D.        Nerve Conduction Studies  Anti Sensory Summary Table     Stim Site NR Peak (ms) Norm Peak (ms) P-T Amp (µV) Norm P-T Amp Site1 Site2 Dist (cm)   Left Median Anti Sensory (2nd Digit)  35.2°C   Wrist    2.9 <4 44.9 >13 Wrist 2nd Digit 14.0   Right Median Anti Sensory (2nd Digit)  35°C   Wrist    3.2 <4 46.0 >13 Wrist 2nd Digit 14.0   Right Radial Anti Sensory (Base 1st Digit)  34.9°C   Wrist    2.1 <2.8 18.7 >11 Wrist Base 1st Digit 10.0   Left Ulnar Anti Sensory (5th Digit)  35.3°C   Wrist    3.3 <4.0 40.8 >9 Wrist 5th Digit 14.0   Right Ulnar Anti Sensory (5th Digit)  34.9°C   Wrist    2.9 <4.0 36.6 >9 Wrist 5th Digit 14.0     Motor Summary Table     Stim Site NR Onset (ms) Norm Onset (ms) O-P Amp (mV) Norm O-P Amp P-T Amp (mV) Site1 Site2 Dist (cm) Samuel (m/s)   Left Median Motor (Abd Poll Brev)  34.6°C   Wrist    4.1 <4.5 6.2 >4.1 10.9 Wrist Abd Poll Brev 8.0    Elbow    7.1  5.6  11.0 Elbow Wrist 22.0 73   Right Median Motor (Abd Poll Brev)  33.4°C   Wrist    4.1 <4.5 6.5 >4.1 10.2 Wrist Abd Poll Brev 8.0    Elbow    8.0  5.9  9.3 Elbow Wrist 24.5 63   Left Ulnar Motor (Abd Dig Minimi)  35.1°C   Wrist    2.3 <3.1 9.3 >7.0 14.8 Wrist Abd Dig Minimi 8.0    B Elbow    5.3  8.8  15.2 B Elbow Wrist 19.5 65   A Elbow    6.9  8.4  14.9 A Elbow B Elbow 10.0 62   Right Ulnar Motor (Abd Dig Minimi)  34.6°C   Wrist    2.3 <3.1 10.1 >7.0 14.1 Wrist Abd Dig Minimi 8.0    B Elbow    5.7  9.5  13.6 B Elbow Wrist 19.5 57   A Elbow    7.6  9.4  13.4 A Elbow B Elbow 10.0 53     F Wave Studies     NR F-Lat (ms) Lat Norm (ms) L-R F-Lat (ms) L-R Lat Norm   Right Ulnar (Mrkrs) (Abd Dig Min)  34.7°C      20.00 <32  <2.5     EMG     Side Muscle Nerve Root Ins Act Fibs Psw Recrt Duration Amp Poly Comment   Left Biceps Musculocut C5-6 Nml Nml Nml Nml Nml Nml Nml    Left 1stDorInt Ulnar C8-T1 Nml Nml Nml Nml Nml Nml Nml    Left Abd Poll Brev Median C8-T1 Nml Nml Nml Nml Nml Nml Nml    Left Triceps Radial C6-7-8 Nml Nml Nml Nml Nml Nml Nml    Left Deltoid Axillary C5-6 Nml Nml Nml Nml Nml Nml Nml    Left BrachioRad Radial C5-6 Nml Nml Nml Nml Nml Nml Nml    Left ExtCarRad Radial C6-7 Nml Nml Nml Nml Nml Nml Nml    Left Lower Cerv Parasp Rami C7,T1 Nml Nml Nml Nml Nml Nml Nml    Right 1stDorInt Ulnar C8-T1 Nml Nml Nml Nml Nml Nml Nml    Right Abd Poll Brev Median C8-T1 Nml Nml Nml Nml Nml Nml Nml    Right Biceps Musculocut C5-6 Nml Nml Nml Nml Nml Nml Nml    Right Triceps Radial C6-7-8 Nml Nml Nml Nml Nml Nml Nml    Right Deltoid Axillary C5-6 Nml Nml Nml Nml Nml Nml Nml    Right BrachioRad Radial C5-6 Nml Nml Nml Nml Nml Nml Nml    Right ExtCarRad Radial C6-7 Nml Nml Nml Nml Nml Nml Nml    Right Lower Cerv Parasp Rami C7,T1 Nml Nml Nml Nml Nml Nml Nml          Waveforms:

## 2018-07-25 NOTE — LETTER
7/25/2018 9:27 PM 
 
Patient:  Mony Samuels YOB: 1960 Date of Visit: 7/25/2018 Dear No Recipients: Thank you for referring Ms. Mony Samuels to me for evaluation/treatment. Below are the relevant portions of my assessment and plan of care. EMG dictated in procedure note If you have questions, please do not hesitate to call me. I look forward to following Ms. Amaya along with you. Sincerely, Janet Szymanski MD

## 2018-10-29 ENCOUNTER — TELEPHONE (OUTPATIENT)
Dept: NEUROLOGY | Age: 58
End: 2018-10-29

## 2018-10-29 NOTE — TELEPHONE ENCOUNTER
----- Message from Bebo Lyons sent at 10/29/2018  1:09 PM EDT -----  Regarding: Dr Arredondo/telephone  Pt (p) [phone], pt would like to know if she can be seen before her scheduled appt on 2/15/18, due to her new dx of Piraformis

## 2018-10-29 NOTE — TELEPHONE ENCOUNTER
I called and put the patient on the cancellation list.  Reji Larkin to cardiologist for testing. But felt issues with her foot.   Sent to Dr Kristie Powers for review    New dx alejandro

## 2018-11-13 ENCOUNTER — OFFICE VISIT (OUTPATIENT)
Dept: NEUROLOGY | Age: 58
End: 2018-11-13

## 2018-11-13 VITALS
OXYGEN SATURATION: 97 % | DIASTOLIC BLOOD PRESSURE: 78 MMHG | BODY MASS INDEX: 32.88 KG/M2 | HEIGHT: 69 IN | HEART RATE: 70 BPM | SYSTOLIC BLOOD PRESSURE: 130 MMHG | WEIGHT: 222 LBS

## 2018-11-13 DIAGNOSIS — M54.16 LUMBAR BACK PAIN WITH RADICULOPATHY AFFECTING RIGHT LOWER EXTREMITY: ICD-10-CM

## 2018-11-13 DIAGNOSIS — G43.109 MIGRAINE WITH AURA AND WITHOUT STATUS MIGRAINOSUS, NOT INTRACTABLE: ICD-10-CM

## 2018-11-13 DIAGNOSIS — M54.16 LUMBAR BACK PAIN WITH RADICULOPATHY AFFECTING LEFT LOWER EXTREMITY: Primary | ICD-10-CM

## 2018-11-13 PROBLEM — Z98.890 HISTORY OF LUMBAR LAMINECTOMY: Status: ACTIVE | Noted: 2018-11-13

## 2018-11-13 RX ORDER — DICLOFENAC SODIUM 75 MG/1
75 TABLET, DELAYED RELEASE ORAL
COMMUNITY
End: 2021-07-07

## 2018-11-13 NOTE — PATIENT INSTRUCTIONS
Office Policieso Phone calls/patient messages: Please allow up to 24 hours for someone in the office to contact you about your call or message. Be mindful your provider may be out of the office or your message may require further review. We encourage you to use [employer] for your messages as this is a faster, more efficient way to communicate with our office 
o Medication Refills: 
Prescription medications require up to 48 business hours to process. We encourage you to use [employer] for your refills. For controlled medications: Please allow up to 72 business hours to process. Certain medications may require you to  a written prescription at our office. NO narcotic/controlled medications will be prescribed after 4pm Monday through Friday or on weekends 
o Form/Paperwork Completion: 
Please note there is a $25 fee for all paperwork completed by our providers. We ask that you allow 7-14 business days. Pre-payment is due prior to picking up/faxing the completed form. You may also download your forms to [employer] to have your doctor print off. A Healthy Lifestyle: Care Instructions Your Care Instructions A healthy lifestyle can help you feel good, stay at a healthy weight, and have plenty of energy for both work and play. A healthy lifestyle is something you can share with your whole family. A healthy lifestyle also can lower your risk for serious health problems, such as high blood pressure, heart disease, and diabetes. You can follow a few steps listed below to improve your health and the health of your family. Follow-up care is a key part of your treatment and safety. Be sure to make and go to all appointments, and call your doctor if you are having problems. It's also a good idea to know your test results and keep a list of the medicines you take. How can you care for yourself at home? · Do not eat too much sugar, fat, or fast foods.  You can still have dessert and treats now and then. The goal is moderation. · Start small to improve your eating habits. Pay attention to portion sizes, drink less juice and soda pop, and eat more fruits and vegetables. ? Eat a healthy amount of food. A 3-ounce serving of meat, for example, is about the size of a deck of cards. Fill the rest of your plate with vegetables and whole grains. ? Limit the amount of soda and sports drinks you have every day. Drink more water when you are thirsty. ? Eat at least 5 servings of fruits and vegetables every day. It may seem like a lot, but it is not hard to reach this goal. A serving or helping is 1 piece of fruit, 1 cup of vegetables, or 2 cups of leafy, raw vegetables. Have an apple or some carrot sticks as an afternoon snack instead of a candy bar. Try to have fruits and/or vegetables at every meal. 
· Make exercise part of your daily routine. You may want to start with simple activities, such as walking, bicycling, or slow swimming. Try to be active 30 to 60 minutes every day. You do not need to do all 30 to 60 minutes all at once. For example, you can exercise 3 times a day for 10 or 20 minutes. Moderate exercise is safe for most people, but it is always a good idea to talk to your doctor before starting an exercise program. 
· Keep moving. Harl Tiear the lawn, work in the garden, or [employer]. Take the stairs instead of the elevator at work. · If you smoke, quit. People who smoke have an increased risk for heart attack, stroke, cancer, and other lung illnesses. Quitting is hard, but there are ways to boost your chance of quitting tobacco for good. ? Use nicotine gum, patches, or lozenges. ? Ask your doctor about stop-smoking programs and medicines. ? Keep trying.  
In addition to reducing your risk of diseases in the future, you will notice some benefits soon after you stop using tobacco. If you have shortness of breath or asthma symptoms, they will likely get better within a few weeks after you quit. · Limit how much alcohol you drink. Moderate amounts of alcohol (up to 2 drinks a day for men, 1 drink a day for women) are okay. But drinking too much can lead to liver problems, high blood pressure, and other health problems. Family health If you have a family, there are many things you can do together to improve your health. · Eat meals together as a family as often as possible. · Eat healthy foods. This includes fruits, vegetables, lean meats and dairy, and whole grains. · Include your family in your fitness plan. Most people think of activities such as jogging or tennis as the way to fitness, but there are many ways you and your family can be more active. Anything that makes you breathe hard and gets your heart pumping is exercise. Here are some tips: 
? Walk to do errands or to take your child to school or the bus. 
? Go for a family bike ride after dinner instead of watching TV. Where can you learn more? Go to http://kimmie-subha.info/. Enter B134 in the search box to learn more about \"A Healthy Lifestyle: Care Instructions. \" Current as of: December 7, 2017 Content Version: 11.8 © 2715-9484 [employer]. Care instructions adapted under license by [employer] (which disclaims liability or warranty for this information). If you have questions about a medical condition or this instruction, always ask your healthcare professional. [place] [address] any warranty or liability for your use of this information.

## 2018-11-13 NOTE — LETTER
11/13/2018 9:45 PM 
 
Patient:  Archie Robles YOB: 1960 Date of Visit: 11/13/2018 Dear No Recipients: Thank you for referring Ms. Archie Robles to me for evaluation/treatment. Below are the relevant portions of my assessment and plan of care. Consult REFERRED BY: 
Cresencio Bañuelos MD 
 
CHIEF COMPLAINT: New severe headache Subjective:  
 
Archie Robles is a 62 y.o. right-handed  female we are asked to evaluate on urgent work in basis at the referral of Dr. Sissy Collier for new problem of new radiating into her right leg that occurred when she was about to undergo a cardiac workup, and the person doing the echocardiogram told her she probably had a piriformis syndrome. She has had pain going down her left leg in addition also, and it is a little bit better now, but she wants to know what to do about her piriformis syndrome and and on urgent work in basis. She has had 2 previous lumbar laminectomies, and is already getting some therapy on her back for chronic post lumbar laminectomy syndrome and even had injections in her back in the past.  We gave her a prescription for physical therapy to see if that will help, but she also needs an MRI of the back because of her pain, weakness in the legs, numbness, and progressive neurological symptoms and difficulty walking. Her bowel and bladder function remain stable. She may have lifted 2 heavy objects around the house. She needs an EMG of both legs, mainly because I think her main problem is lumbar radiculopathy, not piriformis syndrome.   She continues taking her anti-inflammatory which is helping quite a bit so far and doing a stretching exercise program.  She does have headache in the bifrontal head region that is fairly frequent and that is associated with increasing numbness radiating into her hands, mainly along the little finger and palm side of the hand, right side greater than left. She had an EMG study of both arms 6 months ago that was normal, showing no clear evidence of ulnar neuropathy or carpal tunnel or cervical radiculopathy. She supposedly has a known cervical disc disease in the neck. She denies any fever, meningismus, trauma, increased stress or depression or tension as a cause of her headaches. She is not aware of any precipitating or relieving factors for the headache. She is already on gabapentin 400 mg 3 times a day, Ritalin 10 mg twice a day, Xanax 1 mg twice a day, Wellbutrin 300 mg once a day, Lexapro 20 mg a day because of her depression. She is retired on disability because of her depression. She had a previous lumbar laminectomy twice in the past, and bilateral carpal tunnel syndrome surgery, and trigger finger of her left thumb that was fixed in 2013. She had gallbladder surgery in 2011. She had a previous history of migraine years ago, and had a normal MRI of the brain that was 10-15 years ago and a normal CT then. She took Topamax then, but is looking for medication that she can take on a as needed basis now. She has tried Imitrex in the past with partial relief but that does not seem to stop all the headaches now. She is concerned that there is something wrong. She has had no real cognitive issues, no visual loss, no other cranial nerve problems, but the headaches are getting very bad and she has difficulty doing her daily function with these headaches. She is concerned she may have a tumor. She has not had any clear gait instability. She did have a recent fall when she slipped and hit her left ear, and has a minor laceration there. She does not have any nausea vomiting and the pressure is described as a severe pressure sensation across the frontal head region. She has some chronic discomfort in the posterior craniocervical junction from her neck problems in addition. Patient has chronic numbness in her left foot and loss of ankle jerks because of her back operation. Past Medical History:  
Diagnosis Date  Chronic mental illness  Lumbar back pain with radiculopathy affecting left lower extremity  Lumbar back pain with radiculopathy affecting right lower extremity  Migraine Past Surgical History:  
Procedure Laterality Date  HX CHOLECYSTECTOMY  2011  HX LUMBAR LAMINECTOMY  HX ORTHOPAEDIC  2014, [address] Lumbar Laminectomy [place] [facility]  2011 CTS both hands  HX ORTHOPAEDIC  2013  
 left thumb Family History Problem Relation Age of Onset  Cancer Mother   
     lung  Dementia Father  Heart Disease Brother Social History Tobacco Use  Smoking status: Never Smoker  Smokeless tobacco: Never Used Substance Use Topics  Alcohol use: No  
   
 
Current Outpatient Medications:  
  diclofenac EC (VOLTAREN) 75 mg EC tablet, Take 75 mg by mouth two (2) times a day., Disp: , Rfl:  
  ALPRAZolam (XANAX) 1 mg tablet, 1 mg two (2) times a day., Disp: , Rfl: 0 
  buPROPion XL (WELLBUTRIN XL) 300 mg XL tablet, take 1 tablet by mouth once daily, Disp: , Rfl: 0 
  escitalopram oxalate (LEXAPRO) 20 mg tablet, escitalopram 20 mg tablet daily, Disp: , Rfl:  
  gabapentin (NEURONTIN) 400 mg capsule, 400 mg three (3) times daily. , Disp: , Rfl:  
  methylphenidate HCl (RITALIN) 10 mg tablet, take 1 tablet by mouth twice a day, Disp: , Rfl: 0 
  pantoprazole (PROTONIX) 40 mg tablet, 40 mg daily. , Disp: , Rfl: 0 
  simvastatin (ZOCOR) 20 mg tablet, 20 mg nightly., Disp: , Rfl:  
  zolpidem (AMBIEN) 10 mg tablet, 10 mg nightly., Disp: , Rfl: 0 
  ferrous sulfate 325 mg (65 mg iron) tablet, Take  by mouth Daily (before breakfast). , Disp: , Rfl:  
  FIBER, DEXTRIN, PO, Take 3 mg by mouth three (3) times daily. , Disp: , Rfl:  
  cholecalciferol, vitamin D3, (VITAMIN D3) 2,000 unit tab, Take  by mouth daily. , Disp: , Rfl:  
  butalbital-acetaminophen-caffeine (FIORICET, ESGIC) -40 mg per tablet, Take 2 Tabs by mouth every eight (8) hours as needed for Pain or Headache. Indications: Migraine, TENSION-TYPE HEADACHE, Disp: 50 Tab, Rfl: 11 
  SUMAtriptan (IMITREX) 100 mg tablet, Take 1 Tab by mouth as needed for Migraine. , Disp: 9 Tab, Rfl: 5 Allergies Allergen Reactions  Clindamycin Hcl Diarrhea and Other (comments) C-diff  Penicillins Other (comments) Body aches  Prednisone Other (comments)  
  hyper  Sulfa (Sulfonamide Antibiotics) Unknown (comments) Review of Systems: A comprehensive review of systems was negative except for: Musculoskeletal: positive for myalgias, arthralgias and stiff joints Neurological: positive for headaches Behvioral/Psych: positive for anxiety and depression Vitals:  
 11/13/18 1005 BP: 130/78 Pulse: 70 SpO2: 97% Weight: 222 lb (100.7 kg) Height: 5' 9\" (1.753 m) Objective: I 
 
 
NEUROLOGICAL EXAM: 
 
Appearance: The patient is well developed, well nourished, provides a coherent history and is in no acute distress. Mental Status: Oriented to time, place and person, and the president, cognitive function is normal and speech is fluent and no aphasia or dysarthria. Mood and affect appropriate but appears depressed. Cranial Nerves:   Intact visual fields. Fundi are benign. EDIL, EOM's full, no nystagmus, no ptosis. Facial sensation is normal. Corneal reflexes are not tested. Facial movement is symmetric. Hearing is normal bilaterally. Palate is midline with normal sternocleidomastoid and trapezius muscles are normal. Tongue is midline. Neck without meningismus or bruits Temporal arteries are not tender or enlarged Motor:  5/5 strength in upper and lower proximal and distal muscles. Normal bulk and tone. No fasciculations.   
Reflexes:   Deep tendon reflexes 2+/4 and symmetrical, except for absent left ankle jerks because of her previous back operation. No babinski or clonus present Patient has a Tinel's over both ulnar nerves at the elbows bilaterally suggesting tardy ulnar palsy's bilaterally. Sensory:   Normal to touch, pinprick and vibration and temperature. DSS is intact Gait:  Normal gait. Tremor:   No tremor noted. Cerebellar:  No cerebellar signs present. Neurovascular:  Normal heart sounds and regular rhythm, peripheral pulses decreased, and no carotid bruits. Assessment: ICD-10-CM ICD-9-CM 1. Lumbar back pain with radiculopathy affecting left lower extremity M54.16 724.4 EMG LIMITED MRI LUMB SPINE W WO CONT 2. Lumbar back pain with radiculopathy affecting right lower extremity M54.16 724.4 EMG LIMITED MRI LUMB SPINE W WO CONT 3. Migraine with aura and without status migrainosus, not intractable G43.109 346.00 Active Problems: * No active hospital problems. * 
 
 
Plan:  
 
Patient with new problem of recurrent radiculopathy into both legs, most likely secondary to her pulse lumbar laminectomy syndrome, or new lumbar radiculopathy, but doubt this is related to a piriformis syndrome In view of her severity of symptoms, we will check an MRI of the back, and an EMG of both legs. We will give her Fioricet to use as needed for the headache, she had an MRI scan, and STEPHY and a sed rate to rule out temporal arteritis or other causes of her headaches that were normal 
We also gave her a prescription for physical therapy Further treatment evaluation would depend on the results of her tests and clinical course She does not want to try preventive therapy at this time.  
Because of her numbness in both hands in the ulnar distribution she had an EMG study of both arms that was normal  
She will also get cervical spine x-rays to make sure the cervical spine is not showing any instability and she may need an MRI that eventually to evaluate her known cervical disc disease because of her focal neurologic deficits and increasing arm numbness. She may need to go back to her neurosurgeon that she saw last year for that. She will check my chart for results of her tests, or call us, follow-up will be arranged in 3-6 months time or earlier as needed. Follow-up to be arranged on the basis of her test and response to therapy and her medications an MRI scan and EMG 
35 minutes spent with patient today going over her problems discussing her diagnosis prognosis and treatment options with her in detail. Signed By: Enrique Alvarenga MD   
 November 13, 2018 CC: Claudean Scriver, MD 
FAX: [phone] This note will not be viewable in [address] If you have questions, please do not hesitate to call me. I look forward to following Ms. Amaya along with you. Sincerely, Enrique Alvarenga MD

## 2018-11-14 NOTE — PROGRESS NOTES
Consult REFERRED BY: 
Finesse Bryan MD 
 
CHIEF COMPLAINT: New severe headache Subjective:  
 
Genevieve Serna is a 62 y.o. right-handed  female we are asked to evaluate on urgent work in basis at the referral of Dr. Caio Solitario for new problem of new radiating into her right leg that occurred when she was about to undergo a cardiac workup, and the person doing the echocardiogram told her she probably had a piriformis syndrome. She has had pain going down her left leg in addition also, and it is a little bit better now, but she wants to know what to do about her piriformis syndrome and and on urgent work in basis. She has had 2 previous lumbar laminectomies, and is already getting some therapy on her back for chronic post lumbar laminectomy syndrome and even had injections in her back in the past.  We gave her a prescription for physical therapy to see if that will help, but she also needs an MRI of the back because of her pain, weakness in the legs, numbness, and progressive neurological symptoms and difficulty walking. Her bowel and bladder function remain stable. She may have lifted 2 heavy objects around the house. She needs an EMG of both legs, mainly because I think her main problem is lumbar radiculopathy, not piriformis syndrome. She continues taking her anti-inflammatory which is helping quite a bit so far and doing a stretching exercise program.  She does have headache in the bifrontal head region that is fairly frequent and that is associated with increasing numbness radiating into her hands, mainly along the little finger and palm side of the hand, right side greater than left. She had an EMG study of both arms 6 months ago that was normal, showing no clear evidence of ulnar neuropathy or carpal tunnel or cervical radiculopathy. She supposedly has a known cervical disc disease in the neck.   She denies any fever, meningismus, trauma, increased stress or depression or tension as a cause of her headaches. She is not aware of any precipitating or relieving factors for the headache. She is already on gabapentin 400 mg 3 times a day, Ritalin 10 mg twice a day, Xanax 1 mg twice a day, Wellbutrin 300 mg once a day, Lexapro 20 mg a day because of her depression. She is retired on disability because of her depression. She had a previous lumbar laminectomy twice in the past, and bilateral carpal tunnel syndrome surgery, and trigger finger of her left thumb that was fixed in 2013. She had gallbladder surgery in 2011. She had a previous history of migraine years ago, and had a normal MRI of the brain that was 10-15 years ago and a normal CT then. She took Topamax then, but is looking for medication that she can take on a as needed basis now. She has tried Imitrex in the past with partial relief but that does not seem to stop all the headaches now. She is concerned that there is something wrong. She has had no real cognitive issues, no visual loss, no other cranial nerve problems, but the headaches are getting very bad and she has difficulty doing her daily function with these headaches. She is concerned she may have a tumor. She has not had any clear gait instability. She did have a recent fall when she slipped and hit her left ear, and has a minor laceration there. She does not have any nausea vomiting and the pressure is described as a severe pressure sensation across the frontal head region. She has some chronic discomfort in the posterior craniocervical junction from her neck problems in addition. Patient has chronic numbness in her left foot and loss of ankle jerks because of her back operation. Past Medical History:  
Diagnosis Date  Chronic mental illness  Lumbar back pain with radiculopathy affecting left lower extremity  Lumbar back pain with radiculopathy affecting right lower extremity  Migraine Past Surgical History:  
Procedure Laterality Date  HX CHOLECYSTECTOMY  2011  HX LUMBAR LAMINECTOMY  HX ORTHOPAEDIC  2014, [address] Lumbar Laminectomy [facility] ORTH[facility]  2011 CTS both hands  HX ORTHOPAEDIC  2013  
 left thumb Family History Problem Relation Age of Onset  Cancer Mother   
     lung  Dementia Father  Heart Disease Brother Social History Tobacco Use  Smoking status: Never Smoker  Smokeless tobacco: Never Used Substance Use Topics  Alcohol use: No  
   
 
Current Outpatient Medications:  
  diclofenac EC (VOLTAREN) 75 mg EC tablet, Take 75 mg by mouth two (2) times a day., Disp: , Rfl:  
  ALPRAZolam (XANAX) 1 mg tablet, 1 mg two (2) times a day., Disp: , Rfl: 0 
  buPROPion XL (WELLBUTRIN XL) 300 mg XL tablet, take 1 tablet by mouth once daily, Disp: , Rfl: 0 
  escitalopram oxalate (LEXAPRO) 20 mg tablet, escitalopram 20 mg tablet daily, Disp: , Rfl:  
  gabapentin (NEURONTIN) 400 mg capsule, 400 mg three (3) times daily. , Disp: , Rfl:  
  methylphenidate HCl (RITALIN) 10 mg tablet, take 1 tablet by mouth twice a day, Disp: , Rfl: 0 
  pantoprazole (PROTONIX) 40 mg tablet, 40 mg daily. , Disp: , Rfl: 0 
  simvastatin (ZOCOR) 20 mg tablet, 20 mg nightly., Disp: , Rfl:  
  zolpidem (AMBIEN) 10 mg tablet, 10 mg nightly., Disp: , Rfl: 0 
  ferrous sulfate 325 mg (65 mg iron) tablet, Take  by mouth Daily (before breakfast). , Disp: , Rfl:  
  FIBER, DEXTRIN, PO, Take 3 mg by mouth three (3) times daily. , Disp: , Rfl:  
  cholecalciferol, vitamin D3, (VITAMIN D3) 2,000 unit tab, Take  by mouth daily. , Disp: , Rfl:  
  butalbital-acetaminophen-caffeine (FIORICET, ESGIC) -40 mg per tablet, Take 2 Tabs by mouth every eight (8) hours as needed for Pain or Headache.  Indications: Migraine, TENSION-TYPE HEADACHE, Disp: 50 Tab, Rfl: 11 
  SUMAtriptan (IMITREX) 100 mg tablet, Take 1 Tab by mouth as needed for Migraine. , Disp: 9 Tab, Rfl: 5 Allergies Allergen Reactions  Clindamycin Hcl Diarrhea and Other (comments) C-diff  Penicillins Other (comments) Body aches  Prednisone Other (comments)  
  hyper  Sulfa (Sulfonamide Antibiotics) Unknown (comments) Review of Systems: A comprehensive review of systems was negative except for: Musculoskeletal: positive for myalgias, arthralgias and stiff joints Neurological: positive for headaches Behvioral/Psych: positive for anxiety and depression Vitals:  
 11/13/18 1005 BP: 130/78 Pulse: 70 SpO2: 97% Weight: 222 lb (100.7 kg) Height: 5' 9\" (1.753 m) Objective: I 
 
 
NEUROLOGICAL EXAM: 
 
Appearance: The patient is well developed, well nourished, provides a coherent history and is in no acute distress. Mental Status: Oriented to time, place and person, and the president, cognitive function is normal and speech is fluent and no aphasia or dysarthria. Mood and affect appropriate but appears depressed. Cranial Nerves:   Intact visual fields. Fundi are benign. EDIL, EOM's full, no nystagmus, no ptosis. Facial sensation is normal. Corneal reflexes are not tested. Facial movement is symmetric. Hearing is normal bilaterally. Palate is midline with normal sternocleidomastoid and trapezius muscles are normal. Tongue is midline. Neck without meningismus or bruits Temporal arteries are not tender or enlarged Motor:  5/5 strength in upper and lower proximal and distal muscles. Normal bulk and tone. No fasciculations. Reflexes:   Deep tendon reflexes 2+/4 and symmetrical, except for absent left ankle jerks because of her previous back operation. No babinski or clonus present Patient has a Tinel's over both ulnar nerves at the elbows bilaterally suggesting tardy ulnar palsy's bilaterally. Sensory:   Normal to touch, pinprick and vibration and temperature. DSS is intact Gait:  Normal gait. Tremor:   No tremor noted. Cerebellar:  No cerebellar signs present. Neurovascular:  Normal heart sounds and regular rhythm, peripheral pulses decreased, and no carotid bruits. Assessment: ICD-10-CM ICD-9-CM 1. Lumbar back pain with radiculopathy affecting left lower extremity M54.16 724.4 EMG LIMITED MRI LUMB SPINE W WO CONT 2. Lumbar back pain with radiculopathy affecting right lower extremity M54.16 724.4 EMG LIMITED MRI LUMB SPINE W WO CONT 3. Migraine with aura and without status migrainosus, not intractable G43.109 346.00 Active Problems: * No active hospital problems. * 
 
 
Plan:  
 
Patient with new problem of recurrent radiculopathy into both legs, most likely secondary to her pulse lumbar laminectomy syndrome, or new lumbar radiculopathy, but doubt this is related to a piriformis syndrome In view of her severity of symptoms, we will check an MRI of the back, and an EMG of both legs. We will give her Fioricet to use as needed for the headache, she had an MRI scan, and STEPHY and a sed rate to rule out temporal arteritis or other causes of her headaches that were normal 
We also gave her a prescription for physical therapy Further treatment evaluation would depend on the results of her tests and clinical course She does not want to try preventive therapy at this time. Because of her numbness in both hands in the ulnar distribution she had an EMG study of both arms that was normal  
She will also get cervical spine x-rays to make sure the cervical spine is not showing any instability and she may need an MRI that eventually to evaluate her known cervical disc disease because of her focal neurologic deficits and increasing arm numbness. She may need to go back to her neurosurgeon that she saw last year for that. She will check my chart for results of her tests, or call us, follow-up will be arranged in 3-6 months time or earlier as needed. Follow-up to be arranged on the basis of her test and response to therapy and her medications an MRI scan and EMG 
35 minutes spent with patient today going over her problems discussing her diagnosis prognosis and treatment options with her in detail. Signed By: Tanvir Gonzales MD   
 November 13, 2018 CC: Medina Suarez MD 
FAX: [phone] This note will not be viewable in [address]

## 2018-11-28 ENCOUNTER — OFFICE VISIT (OUTPATIENT)
Dept: NEUROLOGY | Age: 58
End: 2018-11-28

## 2018-11-28 VITALS — DIASTOLIC BLOOD PRESSURE: 69 MMHG | OXYGEN SATURATION: 97 % | HEART RATE: 72 BPM | SYSTOLIC BLOOD PRESSURE: 124 MMHG

## 2018-11-28 DIAGNOSIS — Z98.890 HISTORY OF LUMBAR LAMINECTOMY: ICD-10-CM

## 2018-11-28 DIAGNOSIS — M54.16 LUMBAR BACK PAIN WITH RADICULOPATHY AFFECTING RIGHT LOWER EXTREMITY: ICD-10-CM

## 2018-11-28 DIAGNOSIS — M79.7 FIBROMYALGIA: ICD-10-CM

## 2018-11-28 DIAGNOSIS — M47.22 CERVICAL RADICULOPATHY DUE TO DEGENERATIVE JOINT DISEASE OF SPINE: ICD-10-CM

## 2018-11-28 DIAGNOSIS — M54.16 LUMBAR BACK PAIN WITH RADICULOPATHY AFFECTING LEFT LOWER EXTREMITY: Primary | ICD-10-CM

## 2018-11-28 NOTE — LETTER
11/28/2018 8:19 PM 
 
Patient:  Tova Boxer YOB: 1960 Date of Visit: 11/28/2018 Dear No Recipients: Thank you for referring Ms. Tova Boxer to me for evaluation/treatment. Below are the relevant portions of my assessment and plan of care. EMG study dictated in procedure note EMG study was essentially normal of both the right and left leg showing no clear evidence of motor radiculopathy, entrapment neuropathy, or other neuromuscular disease. If you have questions, please do not hesitate to call me. I look forward to following Ms. Amaya along with you. Sincerely, Shravan Frank MD

## 2018-11-28 NOTE — PROCEDURES
[facility] at [facility]        Tel: [phone] ? Fax: [phone]    ELECTRODIAGNOSTIC REPORT      Test Date:  2018    Patient: Valerie Menezes : 1960 Physician: Alec Richardson M.D. Sex: Female  < Ref Phys: Alec Richardson M.D. Technician; Mirna Edmond    Patient History: Patient is a 20-year-old female with chronic back pain with radicular pain into both legs, and possible piriformis syndrome, EMG study to rule out piriformis syndrome, rule out lumbar radiculopathy, rule out entrapment neuropathy, rule out other neuromuscular disease. Neuro exam: Patient has hypoactive but symmetric reflexes throughout, with somewhat decreased left ankle jerk. Patient may have trace weakness of her left dorsiflexors. Sensory examination is intact, and patient has normal muscle bulk and tone throughout. Patient has no Babinski or clonus present, cranial nerves II through XII intact, mental status normal, patient tender on percussion over the lumbar spine, and has positive straight leg raising test bilaterally at 90 degrees. Patient has a relatively normal gait. Exam: This study shows electrophysiologic evidence of an essentially normal EMG and nerve conduction velocity study of both lower extremities, showing no clear evidence of entrapment neuropathy, motor radiculopathy, lumbar plexopathy, or other neuromuscular disease. Clinical correlation recommended, and correlation with other imaging modalities may be of further diagnostic benefit if clinically indicated, and repeat studies in 6-12 months time may also be of further diagnostic benefit. EMG & NCV Findings:  Evaluation of the left Fibular motor and the right Fibular motor nerves showed normal distal onset latency (L4.8, R3.8 ms), normal amplitude (L6.4, R4.1 mV), normal conduction velocity (B Fib-Ankle, L43, R43 m/s), and normal conduction velocity (Poplt-B Fib, L63, R56 m/s).   The left tibial motor and the right tibial motor nerves showed normal distal onset latency (L3.8, R2.7 ms), normal amplitude (L10.3, R5.5 mV), and normal conduction velocity (Knee-Ankle, L48, R44 m/s). The left Sup Fibular sensory and the right Sup Fibular sensory nerves showed normal distal peak latency (L2.9, R2.8 ms), normal amplitude (L8.4, R13.7 µV), and normal conduction velocity (Lower leg-Lat ankle, L50, R59 m/s). The left sural sensory and the right sural sensory nerves showed normal distal peak latency (L3.8, R3.4 ms) and normal amplitude (L10.1, R11.4 µV). All F Wave latencies were within normal limits.                 __________________  Radha Pak M.D.        Nerve Conduction Studies  Anti Sensory Summary Table     Stim Site NR Peak (ms) Norm Peak (ms) P-T Amp (µV) Norm P-T Amp Site1 Site2 Dist (cm)   Left Sup Fibular Anti Sensory (Lat ankle)   Lower leg    2.9 <4.5 8.4 >5 Lower leg Lat ankle 10.0   Right Sup Fibular Anti Sensory (Lat ankle)   Lower leg    2.8 <4.5 13.7 >5 Lower leg Lat ankle 10.0   Left Sural Anti Sensory (Lat Mall)   Calf    3.8 <4.5 10.1 >4.0 Calf Lat Mall 14.0   Right Sural Anti Sensory (Lat Mall)   Calf    3.4 <4.5 11.4 >4.0 Calf Lat Mall 14.0     Motor Summary Table     Stim Site NR Onset (ms) Norm Onset (ms) O-P Amp (mV) Norm O-P Amp P-T Amp (mV) Site1 Site2 Dist (cm) Samuel (m/s)   Left Fibular Motor (Ext Dig Brev)   Ankle    4.8 <6.5 6.4 >2.6 10.4 Ankle Ext Dig Brev 8.0    B Fib    11.9  5.7  8.5 B Fib Ankle 30.5 43   Poplt    13.5  6.0  9.1 Poplt B Fib 10.0 63   Right Fibular Motor (Ext Dig Brev)   Ankle    3.8 <6.5 4.1 >2.6 6.3 Ankle Ext Dig Brev 8.0    B Fib    11.5  3.7  5.2 B Fib Ankle 33.0 43   Poplt    13.3  3.6  5.0 Poplt B Fib 10.0 56   Left Tibial Motor (Abd Henson Brev)   Ankle    3.8 <6.1 10.3 >5.3 5.7 Ankle Abd Henson Brev 8.0    Knee    12.7  7.0  3.2 Knee Ankle 43.0 48   Right Tibial Motor (Abd Henson Brev)   Ankle    2.7 <6.1 5.5 >5.3 12.1 Ankle Abd Henson Brev 8.0    Knee    12.7  4.5  9.1 Knee Ankle 43.5 40     F Wave Studies     NR F-Lat (ms) Lat Norm (ms) L-R F-Lat (ms) L-R Lat Norm   Right Tibial (Mrkrs) (Abd Hallucis)      49.66 <56  <5.7     EMG     Side Muscle Nerve Root Ins Act Fibs Psw Recrt Duration Amp Poly Comment   Right AntTibialis Dp Br Peron L4-5 Nml Nml Nml Nml Nml Nml Nml    Right Peroneus Long   Nml Nml Nml Nml Nml Nml Nml    Right GluteusMed SupGluteal L4-S1 Nml Nml Nml Nml Nml Nml Nml    Right BicepsFemL Sciatic L5-S2 Nml Nml Nml Nml Nml Nml Nml    Right MedGastroc Tibial S1-2 Nml Nml Nml Nml Nml Nml Nml    Left AntTibialis Dp Br Peron L4-5 Nml Nml Nml Nml Nml Nml Nml    Left MedGastroc Tibial S1-2 Nml Nml Nml Nml Nml Nml Nml    Left VastusLat Femoral L2-4 Nml Nml Nml Nml Nml Nml Nml    Left BicepsFemL Sciatic L5-S2 Nml Nml Nml Nml Nml Nml Nml    Left Peroneus Long   Nml Nml Nml Nml Nml Nml Nml    Left Lower Lumb Parasp Rami L5,S1 Nml Nml Nml Nml Nml Nml Nml    Right Lower Lumb Parasp Rami L5,S1 Nml Nml Nml Nml Nml Nml Nml          Waveforms:

## 2018-11-29 NOTE — PROGRESS NOTES
EMG study dictated in procedure note  EMG study was essentially normal of both the right and left leg showing no clear evidence of motor radiculopathy, entrapment neuropathy, or other neuromuscular disease.

## 2018-12-06 ENCOUNTER — HOSPITAL ENCOUNTER (OUTPATIENT)
Dept: MRI IMAGING | Age: 58
End: 2018-12-06
Attending: PSYCHIATRY & NEUROLOGY

## 2018-12-17 ENCOUNTER — HOSPITAL ENCOUNTER (OUTPATIENT)
Dept: MRI IMAGING | Age: 58
Discharge: HOME | End: 2018-12-17
Attending: PSYCHIATRY & NEUROLOGY
Payer: MEDICARE

## 2018-12-17 DIAGNOSIS — M54.16 LUMBAR BACK PAIN WITH RADICULOPATHY AFFECTING RIGHT LOWER EXTREMITY: ICD-10-CM

## 2018-12-17 DIAGNOSIS — M54.16 LUMBAR BACK PAIN WITH RADICULOPATHY AFFECTING LEFT LOWER EXTREMITY: ICD-10-CM

## 2018-12-17 PROCEDURE — A9575 INJ GADOTERATE MEGLUMI 0.1ML: HCPCS | Performed by: PSYCHIATRY & NEUROLOGY

## 2018-12-17 PROCEDURE — 74011250636 HC RX REV CODE- 250/636: Performed by: PSYCHIATRY & NEUROLOGY

## 2018-12-17 PROCEDURE — 72158 MRI LUMBAR SPINE W/O & W/DYE: CPT

## 2018-12-17 RX ORDER — GADOTERATE MEGLUMINE 376.9 MG/ML
20 INJECTION INTRAVENOUS
Status: COMPLETED | OUTPATIENT
Start: 2018-12-17 | End: 2018-12-17

## 2018-12-17 RX ADMIN — GADOTERATE MEGLUMINE 20 ML: 376.9 INJECTION INTRAVENOUS at 13:00

## 2018-12-18 NOTE — PROGRESS NOTES
I called the patient, told her she has a lot of degenerative changes and postop changes, but nothing that would require surgery, recommend she continue physical therapy and conservative management.

## 2019-01-02 ENCOUNTER — TELEPHONE (OUTPATIENT)
Dept: NEUROLOGY | Age: 59
End: 2019-01-02

## 2019-01-02 NOTE — TELEPHONE ENCOUNTER
----- Message from Tucker Hendricks sent at 1/2/2019 11:25 AM EST -----  Regarding: Dr. Hillary Cantrell  Pt stated she has excruiating pain in her (R) thigh that is numb and burning and having difficulty walking and moving around since Sunday, and medications she is taking is not helping. Pt would like a call from the nurse today to let her know what direction she need to go in. Best contact number [phone].

## 2019-01-03 DIAGNOSIS — Z98.890 HISTORY OF LUMBAR LAMINECTOMY: Primary | ICD-10-CM

## 2019-01-03 DIAGNOSIS — M54.16 LUMBAR BACK PAIN WITH RADICULOPATHY AFFECTING RIGHT LOWER EXTREMITY: ICD-10-CM

## 2019-01-03 RX ORDER — TRAMADOL HYDROCHLORIDE 50 MG/1
50 TABLET ORAL
Qty: 20 TAB | Refills: 0 | Status: SHIPPED | OUTPATIENT
Start: 2019-01-03 | End: 2019-05-21 | Stop reason: ALTCHOICE

## 2019-01-03 NOTE — TELEPHONE ENCOUNTER
Patient called back:  Patient went to PT last Friday. Went well. Was supposed to go today. Sunday she woke up with severe pain right thigh. Does not know where it comes from. Taking gabapentin, diclofenac, tylenol, and robaxin. All with no help. Only relief is to lay down in a certain position. She doesn't understand as she has been doing very well. Gabapentin 400mg in the morning and 800mg at night  Diclofenac twice a day  She knows that you went over the results with her and was happy for that.     Please advise

## 2019-01-03 NOTE — TELEPHONE ENCOUNTER
I called the patient, she is having right anterior thigh pain on awakening on Sunday, bedrest is the only thing that helps it, can not come to the office, she cannot take steroids, so we gave her 20 tramadol and referral to Dr. Karen Renae

## 2019-05-21 ENCOUNTER — OFFICE VISIT (OUTPATIENT)
Dept: NEUROLOGY | Age: 59
End: 2019-05-21

## 2019-05-21 VITALS
OXYGEN SATURATION: 98 % | HEIGHT: 69 IN | HEART RATE: 94 BPM | WEIGHT: 222 LBS | RESPIRATION RATE: 16 BRPM | SYSTOLIC BLOOD PRESSURE: 90 MMHG | BODY MASS INDEX: 32.88 KG/M2 | DIASTOLIC BLOOD PRESSURE: 62 MMHG

## 2019-05-21 DIAGNOSIS — G56.22 ULNAR NEUROPATHY AT ELBOW OF LEFT UPPER EXTREMITY: ICD-10-CM

## 2019-05-21 DIAGNOSIS — G56.21 ULNAR NEUROPATHY AT ELBOW OF RIGHT UPPER EXTREMITY: ICD-10-CM

## 2019-05-21 DIAGNOSIS — G44.209 TENSION VASCULAR HEADACHE: ICD-10-CM

## 2019-05-21 DIAGNOSIS — Z98.890 HISTORY OF LUMBAR LAMINECTOMY: ICD-10-CM

## 2019-05-21 DIAGNOSIS — M79.7 FIBROMYALGIA: ICD-10-CM

## 2019-05-21 DIAGNOSIS — M54.16 LUMBAR BACK PAIN WITH RADICULOPATHY AFFECTING RIGHT LOWER EXTREMITY: ICD-10-CM

## 2019-05-21 DIAGNOSIS — M54.16 LUMBAR BACK PAIN WITH RADICULOPATHY AFFECTING LEFT LOWER EXTREMITY: ICD-10-CM

## 2019-05-21 DIAGNOSIS — G43.109 MIGRAINE WITH AURA AND WITHOUT STATUS MIGRAINOSUS, NOT INTRACTABLE: Primary | ICD-10-CM

## 2019-05-21 DIAGNOSIS — M47.22 CERVICAL RADICULOPATHY DUE TO DEGENERATIVE JOINT DISEASE OF SPINE: ICD-10-CM

## 2019-05-21 RX ORDER — BUTALBITAL, ACETAMINOPHEN AND CAFFEINE 50; 325; 40 MG/1; MG/1; MG/1
2 TABLET ORAL
Qty: 50 TAB | Refills: 11 | Status: SHIPPED | OUTPATIENT
Start: 2019-05-21 | End: 2021-07-07

## 2019-05-21 RX ORDER — TIZANIDINE 4 MG/1
2-4 TABLET ORAL
Qty: 100 TAB | Refills: 5 | Status: SHIPPED | OUTPATIENT
Start: 2019-05-21 | End: 2021-07-07

## 2019-05-21 NOTE — PATIENT INSTRUCTIONS
A Healthy Lifestyle: Care Instructions Your Care Instructions A healthy lifestyle can help you feel good, stay at a healthy weight, and have plenty of energy for both work and play. A healthy lifestyle is something you can share with your whole family. A healthy lifestyle also can lower your risk for serious health problems, such as high blood pressure, heart disease, and diabetes. You can follow a few steps listed below to improve your health and the health of your family. Follow-up care is a key part of your treatment and safety. Be sure to make and go to all appointments, and call your doctor if you are having problems. It's also a good idea to know your test results and keep a list of the medicines you take. How can you care for yourself at home? · Do not eat too much sugar, fat, or fast foods. You can still have dessert and treats now and then. The goal is moderation. · Start small to improve your eating habits. Pay attention to portion sizes, drink less juice and soda pop, and eat more fruits and vegetables. ? Eat a healthy amount of food. A 3-ounce serving of meat, for example, is about the size of a deck of cards. Fill the rest of your plate with vegetables and whole grains. ? Limit the amount of soda and sports drinks you have every day. Drink more water when you are thirsty. ? Eat at least 5 servings of fruits and vegetables every day. It may seem like a lot, but it is not hard to reach this goal. A serving or helping is 1 piece of fruit, 1 cup of vegetables, or 2 cups of leafy, raw vegetables. Have an apple or some carrot sticks as an afternoon snack instead of a candy bar. Try to have fruits and/or vegetables at every meal. 
· Make exercise part of your daily routine. You may want to start with simple activities, such as walking, bicycling, or slow swimming. Try to be active 30 to 60 minutes every day.  You do not need to do all 30 to 60 minutes all at once. For example, you can exercise 3 times a day for 10 or 20 minutes. Moderate exercise is safe for most people, but it is always a good idea to talk to your doctor before starting an exercise program. 
· Keep moving. Raul Pryor the lawn, work in the garden, or [employer]. Take the stairs instead of the elevator at work. · If you smoke, quit. People who smoke have an increased risk for heart attack, stroke, cancer, and other lung illnesses. Quitting is hard, but there are ways to boost your chance of quitting tobacco for good. ? Use nicotine gum, patches, or lozenges. ? Ask your doctor about stop-smoking programs and medicines. ? Keep trying. In addition to reducing your risk of diseases in the future, you will notice some benefits soon after you stop using tobacco. If you have shortness of breath or asthma symptoms, they will likely get better within a few weeks after you quit. · Limit how much alcohol you drink. Moderate amounts of alcohol (up to 2 drinks a day for men, 1 drink a day for women) are okay. But drinking too much can lead to liver problems, high blood pressure, and other health problems. Family health If you have a family, there are many things you can do together to improve your health. · Eat meals together as a family as often as possible. · Eat healthy foods. This includes fruits, vegetables, lean meats and dairy, and whole grains. · Include your family in your fitness plan. Most people think of activities such as jogging or tennis as the way to fitness, but there are many ways you and your family can be more active. Anything that makes you breathe hard and gets your heart pumping is exercise. Here are some tips: 
? Walk to do errands or to take your child to school or the bus. 
? Go for a family bike ride after dinner instead of watching TV. Where can you learn more? Go to http://kimmie-subha.info/. Enter O380 in the search box to learn more about \"A Healthy Lifestyle: Care Instructions. \" Current as of: September 11, 2018 Content Version: 11.9 © 2116-4139 [employer]. Care instructions adapted under license by [employer] (which disclaims liability or warranty for this information). If you have questions about a medical condition or this instruction, always ask your healthcare professional. [address] any warranty or liability for your use of this information. Office Policieso Phone calls/patient messages: Please allow up to 24 hours for someone in the office to contact you about your call or message. Be mindful your provider may be out of the office or your message may require further review. We encourage you to use [employer] for your messages as this is a faster, more efficient way to communicate with our office 
o Medication Refills: 
Prescription medications require up to 48 business hours to process. We encourage you to use [employer] for your refills. For controlled medications: Please allow up to 72 business hours to process. Certain medications may require you to  a written prescription at our office. NO narcotic/controlled medications will be prescribed after 4pm Monday through Friday or on weekends 
o Form/Paperwork Completion: We ask that you allow 7-14 business days. You may also download your forms to [employer] to have your doctor print off.

## 2019-05-21 NOTE — LETTER
5/21/2019 8:13 PM 
 
Patient:  Can Alexander YOB: 1960 Date of Visit: 5/21/2019 Dear No Recipients: Thank you for referring Ms. Can Alexander to me for evaluation/treatment. Below are the relevant portions of my assessment and plan of care. Consult REFERRED BY: 
Pepe Maki MD 
 
CHIEF COMPLAINT: New severe headache Subjective:  
 
Can Alexander is a 61 y.o. right-handed  female we are asked to evaluate at the referral of Dr. Marck Crabtree for new problem of new problem of occasional muscle spasms in her back and stiffness in the back difficulty moving at times, from her post lumbar laminectomy syndrome, and patient wants to know what she can do about this, as she is already doing the stretching exercises which helps some but at times the muscles tighten up quite a bit. We sent in a prescription for Zanaflex for the patient, taking 2 to 4 mg 2-3 times a day as needed. The patient was able to get relief after months of physical therapy on her back, and is trying to avoid surgery. She has had 2 operations on her back years ago, and is trying to avoid a third operation. We sent in tramadol for one flareup, that gradually got better with therapy, but the tramadol did not do much and we will keep her off narcotics if possible. She is already on Neurontin 400 mg 3 times a day for her chronic pain and radiculopathy. She has had 2 previous lumbar laminectomies, and is already getting some therapy on her back for chronic post lumbar laminectomy syndrome and even had injections in her back in the past.  Patient had an EMG study of both legs in December, that showed no clear evidence of motor radiculopathy, and an MRI scan showed a lot of degenerative changes and postop changes but no surgical lesions on the lumbar spine.   She was treated for piriformis syndrome bilaterally and lumbar radiculopathy by the physical therapist.  She continues taking her anti-inflammatory which is helping quite a bit so far and doing a stretching exercise program.  She does have headache in the bifrontal head region that is fairly frequent and that is associated with increasing numbness radiating into her hands, mainly along the little finger and palm side of the hand, right side greater than left. She had an EMG study of both arms 12 months ago that was normal, showing no clear evidence of ulnar neuropathy or carpal tunnel or cervical radiculopathy. She supposedly has a known cervical disc disease in the neck. She denies any fever, meningismus, trauma, increased stress or depression or tension as a cause of her headaches. She is not aware of any precipitating or relieving factors for the headache. She is already on gabapentin 400 mg 3 times a day, Ritalin 10 mg twice a day, Xanax 1 mg twice a day, Wellbutrin 300 mg once a day, Lexapro 20 mg a day because of her depression. She is retired on disability because of her depression. She had a previous lumbar laminectomy twice in the past, and bilateral carpal tunnel syndrome surgery, and trigger finger of her left thumb that was fixed in 2013. She had gallbladder surgery in 2011. She had a previous history of migraine years ago, and had a normal MRI of the brain that was 10-15 years ago and a normal CT then. She took Topamax then, but is looking for medication that she can take on a as needed basis now. She has tried Imitrex in the past with partial relief but that does not seem to stop all the headaches now. She has had no real cognitive issues, no visual loss, no other cranial nerve problems, but the headaches are getting very bad and she has difficulty doing her daily function with these headaches. She has not had any clear gait instability. She has some chronic discomfort in the posterior craniocervical junction from her neck problems in addition. Patient has chronic numbness in her left foot and loss of ankle jerks because of her back operation. Past Medical History:  
Diagnosis Date  Chronic mental illness  Lumbar back pain with radiculopathy affecting left lower extremity  Lumbar back pain with radiculopathy affecting right lower extremity  Migraine Past Surgical History:  
Procedure Laterality Date  HX CHOLECYSTECTOMY  2011  HX LUMBAR LAMINECTOMY  HX ORTHOPAEDIC  2014, [address] Lumbar Laminectomy [facility] ORTHOPAEDIC  2011 CTS both hands  HX ORTHOPAEDIC  2013  
 left thumb Family History Problem Relation Age of Onset  Cancer Mother   
     lung  Dementia Father  Heart Disease Brother Social History Tobacco Use  Smoking status: Never Smoker  Smokeless tobacco: Never Used Substance Use Topics  Alcohol use: No  
   
 
Current Outpatient Medications:  
  butalbital-acetaminophen-caffeine (FIORICET, ESGIC) -40 mg per tablet, Take 2 Tabs by mouth every eight (8) hours as needed for Pain or Headache. Indications: migraine headache, Tension Headache, Disp: 50 Tab, Rfl: 11 
  tiZANidine (ZANAFLEX) 4 mg tablet, Take 0.5-1 Tabs by mouth three (3) times daily as needed for Pain., Disp: 100 Tab, Rfl: 5 
  diclofenac EC (VOLTAREN) 75 mg EC tablet, Take 75 mg by mouth. As needed, Disp: , Rfl:  
  ALPRAZolam (XANAX) 1 mg tablet, 1 mg two (2) times a day., Disp: , Rfl: 0 
  buPROPion XL (WELLBUTRIN XL) 300 mg XL tablet, take 1 tablet by mouth once daily, Disp: , Rfl: 0 
  escitalopram oxalate (LEXAPRO) 20 mg tablet, escitalopram 20 mg tablet daily, Disp: , Rfl:  
  gabapentin (NEURONTIN) 400 mg capsule, 400 mg three (3) times daily. , Disp: , Rfl:  
  methylphenidate HCl (RITALIN) 10 mg tablet, take 1 tablet by mouth twice a day, Disp: , Rfl: 0 
  pantoprazole (PROTONIX) 40 mg tablet, 40 mg daily. , Disp: , Rfl: 0 
   simvastatin (ZOCOR) 20 mg tablet, 20 mg nightly., Disp: , Rfl:  
  zolpidem (AMBIEN) 10 mg tablet, 10 mg nightly., Disp: , Rfl: 0 
  ferrous sulfate 325 mg (65 mg iron) tablet, Take  by mouth Daily (before breakfast). , Disp: , Rfl:  
  FIBER, DEXTRIN, PO, Take 3 mg by mouth three (3) times daily. , Disp: , Rfl:  
  cholecalciferol, vitamin D3, (VITAMIN D3) 2,000 unit tab, Take  by mouth daily. , Disp: , Rfl:  
 
 
 
Allergies Allergen Reactions  Clindamycin Hcl Diarrhea and Other (comments) C-diff  Penicillins Other (comments) Body aches  Prednisone Other (comments)  
  hyper  Sulfa (Sulfonamide Antibiotics) Unknown (comments) Review of Systems: A comprehensive review of systems was negative except for: Musculoskeletal: positive for myalgias, arthralgias and stiff joints Neurological: positive for headaches Behvioral/Psych: positive for anxiety and depression Vitals:  
 05/21/19 1059 BP: 90/62 Pulse: 94 Resp: 16 SpO2: 98% Weight: 222 lb (100.7 kg) Height: 5' 9\" (1.753 m) Objective: I 
 
 
NEUROLOGICAL EXAM: 
 
Appearance: The patient is well developed, well nourished, provides a coherent history and is in no acute distress. Mental Status: Oriented to time, place and person, and the president, cognitive function is normal and speech is fluent and no aphasia or dysarthria. Mood and affect appropriate but appears depressed. Cranial Nerves:   Intact visual fields. Fundi are benign. EDIL, EOM's full, no nystagmus, no ptosis. Facial sensation is normal. Corneal reflexes are not tested. Facial movement is symmetric. Hearing is normal bilaterally. Palate is midline with normal sternocleidomastoid and trapezius muscles are normal. Tongue is midline. Neck without meningismus or bruits Temporal arteries are not tender or enlarged Motor:  5/5 strength in upper and lower proximal and distal muscles. Normal bulk and tone. No fasciculations. Reflexes:   Deep tendon reflexes 2+/4 and symmetrical, except for absent left ankle jerks because of her previous back operation. No babinski or clonus present Patient has a Tinel's over both ulnar nerves at the elbows bilaterally suggesting tardy ulnar palsy's bilaterally. Sensory:   Normal to touch, pinprick and vibration and temperature. DSS is intact Gait:  Normal gait. Tremor:   No tremor noted. Cerebellar:  No cerebellar signs present. Neurovascular:  Normal heart sounds and regular rhythm, peripheral pulses decreased, and no carotid bruits. Assessment: ICD-10-CM ICD-9-CM 1. Migraine with aura and without status migrainosus, not intractable G43.109 346.00 butalbital-acetaminophen-caffeine (FIORICET, ESGIC) -40 mg per tablet 2. Lumbar back pain with radiculopathy affecting right lower extremity M54.16 724.4 butalbital-acetaminophen-caffeine (FIORICET, ESGIC) -40 mg per tablet 3. Lumbar back pain with radiculopathy affecting left lower extremity M54.16 724.4 butalbital-acetaminophen-caffeine (FIORICET, ESGIC) -40 mg per tablet 4. Cervical radiculopathy due to degenerative joint disease of spine M47.22 721.0 butalbital-acetaminophen-caffeine (FIORICET, ESGIC) -40 mg per tablet 5. Ulnar neuropathy at elbow of right upper extremity G56.21 354.2 butalbital-acetaminophen-caffeine (FIORICET, ESGIC) -40 mg per tablet 6. Ulnar neuropathy at elbow of left upper extremity G56.22 354.2 butalbital-acetaminophen-caffeine (FIORICET, ESGIC) -40 mg per tablet 7. History of lumbar laminectomyx2 Z98.890 V45.89 butalbital-acetaminophen-caffeine (FIORICET, ESGIC) -40 mg per tablet 8. Fibromyalgia M79.7 729.1 butalbital-acetaminophen-caffeine (FIORICET, ESGIC) -40 mg per tablet 9. Tension vascular headache G44.209 307.81 butalbital-acetaminophen-caffeine (FIORICET, ESGIC) -40 mg per tablet Active Problems: * No active hospital problems. * 
 
 
Plan:  
 
Patient with new problem of difficulty with muscle spasms and stiffness, and wanting a muscle relaxant so we gave her Zanaflex 4 mg to take 1/2 to 1 tablet 2-3 times a day as needed. We advised the patient to continue her stretching exercises to relieve the muscle spasms. She is doing a little better after extensive therapy for her piriformis syndrome bilaterally and post lumbar laminectomy syndrome with radiculopathy in both legs Her EMG study showed no clear motor radiculopathy in either leg, and was essentially normal, MRI scan showed extensive degenerative changes, but no surgical lesions she will try to avoid surgery continue her conservative management We will give her Fioricet to use as needed for the headache, she had an MRI scan, and STEPHY and a sed rate to rule out temporal arteritis or other causes of her headaches that were normal 
She does not want to try preventive therapy at this time for her headaches. Because of her numbness in both hands in the ulnar distribution she had an EMG study of both arms that was normal  
She does have extensive arthritis in her neck, but does not appear to have surgical lesions there either. She will check my chart for results of her tests, or call us, follow-up will be arranged in 6 months time or earlier as needed. 35 minutes spent with patient today going over her problems discussing her diagnosis prognosis and treatment options with her in detail. Signed By: Tez Montemayor MD   
 May 21, 2019 CC: Jose Luis Ashley MD 
FAX: [phone] This note will not be viewable in [address] If you have questions, please do not hesitate to call me. I look forward to following Ms. Amaya along with you. Sincerely, Tez Montemayor MD

## 2019-05-22 NOTE — PROGRESS NOTES
Consult  REFERRED BY:  Lydia Nunez MD    CHIEF COMPLAINT: New severe headache      Subjective:     Mychal Lowery is a 61 y.o. right-handed  female we are asked to evaluate at the referral of Dr. Patrice Merino for new problem of new problem of occasional muscle spasms in her back and stiffness in the back difficulty moving at times, from her post lumbar laminectomy syndrome, and patient wants to know what she can do about this, as she is already doing the stretching exercises which helps some but at times the muscles tighten up quite a bit. We sent in a prescription for Zanaflex for the patient, taking 2 to 4 mg 2-3 times a day as needed. The patient was able to get relief after months of physical therapy on her back, and is trying to avoid surgery. She has had 2 operations on her back years ago, and is trying to avoid a third operation. We sent in tramadol for one flareup, that gradually got better with therapy, but the tramadol did not do much and we will keep her off narcotics if possible. She is already on Neurontin 400 mg 3 times a day for her chronic pain and radiculopathy. She has had 2 previous lumbar laminectomies, and is already getting some therapy on her back for chronic post lumbar laminectomy syndrome and even had injections in her back in the past.  Patient had an EMG study of both legs in December, that showed no clear evidence of motor radiculopathy, and an MRI scan showed a lot of degenerative changes and postop changes but no surgical lesions on the lumbar spine.   She was treated for piriformis syndrome bilaterally and lumbar radiculopathy by the physical therapist.  She continues taking her anti-inflammatory which is helping quite a bit so far and doing a stretching exercise program.  She does have headache in the bifrontal head region that is fairly frequent and that is associated with increasing numbness radiating into her hands, mainly along the little finger and palm side of the hand, right side greater than left. She had an EMG study of both arms 12 months ago that was normal, showing no clear evidence of ulnar neuropathy or carpal tunnel or cervical radiculopathy. She supposedly has a known cervical disc disease in the neck. She denies any fever, meningismus, trauma, increased stress or depression or tension as a cause of her headaches. She is not aware of any precipitating or relieving factors for the headache. She is already on gabapentin 400 mg 3 times a day, Ritalin 10 mg twice a day, Xanax 1 mg twice a day, Wellbutrin 300 mg once a day, Lexapro 20 mg a day because of her depression. She is retired on disability because of her depression. She had a previous lumbar laminectomy twice in the past, and bilateral carpal tunnel syndrome surgery, and trigger finger of her left thumb that was fixed in 2013. She had gallbladder surgery in 2011. She had a previous history of migraine years ago, and had a normal MRI of the brain that was 10-15 years ago and a normal CT then. She took Topamax then, but is looking for medication that she can take on a as needed basis now. She has tried Imitrex in the past with partial relief but that does not seem to stop all the headaches now. She has had no real cognitive issues, no visual loss, no other cranial nerve problems, but the headaches are getting very bad and she has difficulty doing her daily function with these headaches. She has not had any clear gait instability. She has some chronic discomfort in the posterior craniocervical junction from her neck problems in addition. Patient has chronic numbness in her left foot and loss of ankle jerks because of her back operation.     Past Medical History:   Diagnosis Date    Chronic mental illness     Lumbar back pain with radiculopathy affecting left lower extremity     Lumbar back pain with radiculopathy affecting right lower extremity     Migraine       Past Surgical History: Procedure Laterality Date    HX CHOLECYSTECTOMY  2011    HX LUMBAR LAMINECTOMY      HX ORTHOPAEDIC  2014, 1989, 8147    Lumbar Laminectomy    HX ORTHOPAEDIC  2011    CTS both hands    HX ORTHOPAEDIC  2013    left thumb      Family History   Problem Relation Age of Onset   [de-identified] Cancer Mother         lung    Dementia Father     Heart Disease Brother       Social History     Tobacco Use    Smoking status: Never Smoker    Smokeless tobacco: Never Used   Substance Use Topics    Alcohol use: No         Current Outpatient Medications:     butalbital-acetaminophen-caffeine (FIORICET, ESGIC) -40 mg per tablet, Take 2 Tabs by mouth every eight (8) hours as needed for Pain or Headache. Indications: migraine headache, Tension Headache, Disp: 50 Tab, Rfl: 11    tiZANidine (ZANAFLEX) 4 mg tablet, Take 0.5-1 Tabs by mouth three (3) times daily as needed for Pain., Disp: 100 Tab, Rfl: 5    diclofenac EC (VOLTAREN) 75 mg EC tablet, Take 75 mg by mouth. As needed, Disp: , Rfl:     ALPRAZolam (XANAX) 1 mg tablet, 1 mg two (2) times a day., Disp: , Rfl: 0    buPROPion XL (WELLBUTRIN XL) 300 mg XL tablet, take 1 tablet by mouth once daily, Disp: , Rfl: 0    escitalopram oxalate (LEXAPRO) 20 mg tablet, escitalopram 20 mg tablet daily, Disp: , Rfl:     gabapentin (NEURONTIN) 400 mg capsule, 400 mg three (3) times daily. , Disp: , Rfl:     methylphenidate HCl (RITALIN) 10 mg tablet, take 1 tablet by mouth twice a day, Disp: , Rfl: 0    pantoprazole (PROTONIX) 40 mg tablet, 40 mg daily. , Disp: , Rfl: 0    simvastatin (ZOCOR) 20 mg tablet, 20 mg nightly., Disp: , Rfl:     zolpidem (AMBIEN) 10 mg tablet, 10 mg nightly., Disp: , Rfl: 0    ferrous sulfate 325 mg (65 mg iron) tablet, Take  by mouth Daily (before breakfast). , Disp: , Rfl:     FIBER, DEXTRIN, PO, Take 3 mg by mouth three (3) times daily. , Disp: , Rfl:     cholecalciferol, vitamin D3, (VITAMIN D3) 2,000 unit tab, Take  by mouth daily. , Disp: , Rfl: Allergies   Allergen Reactions    Clindamycin Hcl Diarrhea and Other (comments)     C-diff    Penicillins Other (comments)     Body aches    Prednisone Other (comments)     hyper    Sulfa (Sulfonamide Antibiotics) Unknown (comments)        Review of Systems:  A comprehensive review of systems was negative except for: Musculoskeletal: positive for myalgias, arthralgias and stiff joints  Neurological: positive for headaches  Behvioral/Psych: positive for anxiety and depression   Vitals:    05/21/19 1059   BP: 90/62   Pulse: 94   Resp: 16   SpO2: 98%   Weight: 222 lb (100.7 kg)   Height: 5' 9\" (1.753 m)     Objective:     I      NEUROLOGICAL EXAM:    Appearance: The patient is well developed, well nourished, provides a coherent history and is in no acute distress. Mental Status: Oriented to time, place and person, and the president, cognitive function is normal and speech is fluent and no aphasia or dysarthria. Mood and affect appropriate but appears depressed. Cranial Nerves:   Intact visual fields. Fundi are benign. EDIL, EOM's full, no nystagmus, no ptosis. Facial sensation is normal. Corneal reflexes are not tested. Facial movement is symmetric. Hearing is normal bilaterally. Palate is midline with normal sternocleidomastoid and trapezius muscles are normal. Tongue is midline. Neck without meningismus or bruits  Temporal arteries are not tender or enlarged   Motor:  5/5 strength in upper and lower proximal and distal muscles. Normal bulk and tone. No fasciculations. Reflexes:   Deep tendon reflexes 2+/4 and symmetrical, except for absent left ankle jerks because of her previous back operation. No babinski or clonus present  Patient has a Tinel's over both ulnar nerves at the elbows bilaterally suggesting tardy ulnar palsy's bilaterally. Sensory:   Normal to touch, pinprick and vibration and temperature. DSS is intact   Gait:  Normal gait. Tremor:   No tremor noted.    Cerebellar:  No cerebellar signs present. Neurovascular:  Normal heart sounds and regular rhythm, peripheral pulses decreased, and no carotid bruits. Assessment:       ICD-10-CM ICD-9-CM    1. Migraine with aura and without status migrainosus, not intractable G43.109 346.00 butalbital-acetaminophen-caffeine (FIORICET, ESGIC) -40 mg per tablet   2. Lumbar back pain with radiculopathy affecting right lower extremity M54.16 724.4 butalbital-acetaminophen-caffeine (FIORICET, ESGIC) -40 mg per tablet   3. Lumbar back pain with radiculopathy affecting left lower extremity M54.16 724.4 butalbital-acetaminophen-caffeine (FIORICET, ESGIC) -40 mg per tablet   4. Cervical radiculopathy due to degenerative joint disease of spine M47.22 721.0 butalbital-acetaminophen-caffeine (FIORICET, ESGIC) -40 mg per tablet   5. Ulnar neuropathy at elbow of right upper extremity G56.21 354.2 butalbital-acetaminophen-caffeine (FIORICET, ESGIC) -40 mg per tablet   6. Ulnar neuropathy at elbow of left upper extremity G56.22 354.2 butalbital-acetaminophen-caffeine (FIORICET, ESGIC) -40 mg per tablet   7. History of lumbar laminectomyx2 Z98.890 V45.89 butalbital-acetaminophen-caffeine (FIORICET, ESGIC) -40 mg per tablet   8. Fibromyalgia M79.7 729.1 butalbital-acetaminophen-caffeine (FIORICET, ESGIC) -40 mg per tablet   9. Tension vascular headache G44.209 307.81 butalbital-acetaminophen-caffeine (FIORICET, ESGIC) -40 mg per tablet     Active Problems:    * No active hospital problems. *      Plan:     Patient with new problem of difficulty with muscle spasms and stiffness, and wanting a muscle relaxant so we gave her Zanaflex 4 mg to take 1/2 to 1 tablet 2-3 times a day as needed. We advised the patient to continue her stretching exercises to relieve the muscle spasms.   She is doing a little better after extensive therapy for her piriformis syndrome bilaterally and post lumbar laminectomy syndrome with radiculopathy in both legs  Her EMG study showed no clear motor radiculopathy in either leg, and was essentially normal, MRI scan showed extensive degenerative changes, but no surgical lesions she will try to avoid surgery continue her conservative management  We will give her Fioricet to use as needed for the headache, she had an MRI scan, and STEPHY and a sed rate to rule out temporal arteritis or other causes of her headaches that were normal  She does not want to try preventive therapy at this time for her headaches. Because of her numbness in both hands in the ulnar distribution she had an EMG study of both arms that was normal   She does have extensive arthritis in her neck, but does not appear to have surgical lesions there either. She will check my chart for results of her tests, or call us, follow-up will be arranged in 6 months time or earlier as needed. 35 minutes spent with patient today going over her problems discussing her diagnosis prognosis and treatment options with her in detail. Signed By: Yvrose Lundberg MD     May 21, 2019       CC: Patience Morfin MD  FAX: [phone]    This note will not be viewable in [address]

## 2019-05-23 ENCOUNTER — TELEPHONE (OUTPATIENT)
Dept: NEUROLOGY | Age: 59
End: 2019-05-23

## 2019-05-23 NOTE — TELEPHONE ENCOUNTER
Called [employer] for verbal PA Fioricet    Decision in 24-48 hrs    Will fax to [phone]  Ref to case [id number].

## 2019-06-12 LAB
HBA1C MFR BLD HPLC: 5.7 %
LDL-C, EXTERNAL: 100
TOTAL CHOLESTEROL, NCHOLT: 163

## 2019-08-24 ENCOUNTER — HOSPITAL ENCOUNTER (EMERGENCY)
Age: 59
Discharge: HOME | End: 2019-08-24
Attending: EMERGENCY MEDICINE
Payer: MEDICARE

## 2019-08-24 VITALS
BODY MASS INDEX: 32.01 KG/M2 | WEIGHT: 211.2 LBS | OXYGEN SATURATION: 100 % | SYSTOLIC BLOOD PRESSURE: 122 MMHG | DIASTOLIC BLOOD PRESSURE: 77 MMHG | TEMPERATURE: 96.9 F | HEART RATE: 77 BPM | RESPIRATION RATE: 19 BRPM | HEIGHT: 68 IN

## 2019-08-24 DIAGNOSIS — M54.16 LUMBAR RADICULOPATHY: ICD-10-CM

## 2019-08-24 DIAGNOSIS — Z98.890 HISTORY OF LUMBAR LAMINECTOMY: ICD-10-CM

## 2019-08-24 DIAGNOSIS — R20.2 PARESTHESIA OF BOTH FEET: Primary | ICD-10-CM

## 2019-08-24 PROCEDURE — 99282 EMERGENCY DEPT VISIT SF MDM: CPT

## 2019-08-24 RX ORDER — IBUPROFEN 800 MG/1
800 TABLET ORAL
Qty: 20 TAB | Refills: 0 | Status: SHIPPED | OUTPATIENT
Start: 2019-08-24 | End: 2019-08-31

## 2019-08-24 RX ORDER — CYCLOBENZAPRINE HCL 10 MG
10 TABLET ORAL
Qty: 20 TAB | Refills: 0 | Status: SHIPPED | OUTPATIENT
Start: 2019-08-24 | End: 2019-10-09 | Stop reason: ALTCHOICE

## 2019-08-25 NOTE — ED PROVIDER NOTES
EMERGENCY DEPARTMENT HISTORY AND PHYSICAL EXAM      Date: 8/24/2019  Patient Name: Deng Cornelius    History of Presenting Illness     Chief Complaint   Patient presents with    Leg Pain     Pain that extends from thigh to foot, sees neurologist       History Provided By: Patient    HPI: Deng Cornelius, 61 y.o. female with a history of lumbar radiculopathy and fibromyalgia presents ambulatory to the ED with cc of many weeks of 7 out of 10 essentially constant, aching bilateral foot pain (left greater than right) that is not associated with any specific injury. She tells me she has been treated for these symptoms with gabapentin, diclofenac and tizanidine for which she has found some improvement. She presents this evening because the sensation (she describes it [place], when you're child and your feet go to sleep\") has become somewhat worse. She sees Dr. She Shea, a neurologist, for these chronic problems. There has been no recent injury. There are no other complaints, changes, or physical findings at this time. PCP: Ethel Guerrero MD    Current Outpatient Medications   Medication Sig Dispense Refill    ibuprofen (MOTRIN) 800 mg tablet Take 1 Tab by mouth every eight (8) hours as needed for Pain for up to 7 days. 20 Tab 0    cyclobenzaprine (FLEXERIL) 10 mg tablet Take 1 Tab by mouth three (3) times daily as needed for Muscle Spasm(s). 20 Tab 0    butalbital-acetaminophen-caffeine (FIORICET, ESGIC) -40 mg per tablet Take 2 Tabs by mouth every eight (8) hours as needed for Pain or Headache. Indications: migraine headache, Tension Headache 50 Tab 11    tiZANidine (ZANAFLEX) 4 mg tablet Take 0.5-1 Tabs by mouth three (3) times daily as needed for Pain. 100 Tab 5    diclofenac EC (VOLTAREN) 75 mg EC tablet Take 75 mg by mouth.  As needed      ALPRAZolam (XANAX) 1 mg tablet 1 mg two (2) times a day.  0    buPROPion XL (WELLBUTRIN XL) 300 mg XL tablet take 1 tablet by mouth once daily  0    escitalopram oxalate (LEXAPRO) 20 mg tablet escitalopram 20 mg tablet daily      gabapentin (NEURONTIN) 400 mg capsule 400 mg three (3) times daily.  methylphenidate HCl (RITALIN) 10 mg tablet take 1 tablet by mouth twice a day  0    pantoprazole (PROTONIX) 40 mg tablet 40 mg daily. 0    simvastatin (ZOCOR) 20 mg tablet 20 mg nightly.  zolpidem (AMBIEN) 10 mg tablet 10 mg nightly. 0    ferrous sulfate 325 mg (65 mg iron) tablet Take  by mouth Daily (before breakfast).  FIBER, DEXTRIN, PO Take 3 mg by mouth three (3) times daily.  cholecalciferol, vitamin D3, (VITAMIN D3) 2,000 unit tab Take  by mouth daily. Past History     Past Medical History:  Past Medical History:   Diagnosis Date    Chronic mental illness     Lumbar back pain with radiculopathy affecting left lower extremity     Lumbar back pain with radiculopathy affecting right lower extremity     Migraine        Past Surgical History:  Past Surgical History:   Procedure Laterality Date    HX CHOLECYSTECTOMY  2011    HX LUMBAR LAMINECTOMY      HX ORTHOPAEDIC  2014, 1989, 1991    Lumbar Laminectomy    HX ORTHOPAEDIC  2011    CTS both hands    HX ORTHOPAEDIC  2013    left thumb        Family History:  Family History   Problem Relation Age of Onset    Cancer Mother         lung    Dementia Father     Heart Disease Brother        Social History:  Social History     Tobacco Use    Smoking status: Never Smoker    Smokeless tobacco: Never Used   Substance Use Topics    Alcohol use: No    Drug use: No       Allergies: Allergies   Allergen Reactions    Clindamycin Hcl Diarrhea and Other (comments)     C-diff    Penicillins Other (comments)     Body aches    Prednisone Other (comments)     hyper    Sulfa (Sulfonamide Antibiotics) Unknown (comments)     Review of Systems   Review of Systems   Constitutional: Negative for fatigue and fever. HENT: Negative for ear pain and sore throat.     Eyes: Negative for pain, redness and visual disturbance. Respiratory: Negative for cough and shortness of breath. Cardiovascular: Negative for chest pain and palpitations. Gastrointestinal: Negative for abdominal pain, nausea and vomiting. Genitourinary: Negative for dysuria, frequency and urgency. Musculoskeletal: Negative for back pain, gait problem, neck pain and neck stiffness. Skin: Negative for rash and wound. Neurological: Positive for numbness (And \"different\" sensation of both her feet). Negative for dizziness, weakness, light-headedness and headaches. Physical Exam   Physical Exam   Constitutional: She is oriented to person, place, and time. She appears well-developed and well-nourished. Non-toxic appearance. No distress. HENT:   Head: Normocephalic and atraumatic. Right Ear: External ear normal.   Left Ear: External ear normal.   Nose: Nose normal.   Mouth/Throat: Uvula is midline. No trismus in the jaw. Eyes: Pupils are equal, round, and reactive to light. Conjunctivae and EOM are normal. No scleral icterus. Neck: Normal range of motion and full passive range of motion without pain. Cardiovascular: Normal rate and regular rhythm. Pulmonary/Chest: Effort normal. No accessory muscle usage. No tachypnea. No respiratory distress. She has no decreased breath sounds. She has no wheezes. Abdominal: Soft. There is no tenderness. Musculoskeletal: Normal range of motion. BILATERAL FEET:  Good symmetry  No bruising, redness or swelling  Symmetric warmth  Symmetric distal pulses  Normal sensation described   Neurological: She is alert and oriented to person, place, and time. She is not disoriented. No cranial nerve deficit. GCS eye subscore is 4. GCS verbal subscore is 5. GCS motor subscore is 6. Skin: Skin is intact. No rash noted. Psychiatric: She has a normal mood and affect. Her speech is normal.   Nursing note and vitals reviewed.     Diagnostic Study Results     Labs -   No results found for this or any previous visit (from the past 12 hour(s)). Radiologic Studies -   No orders to display     CT Results  (Last 48 hours)    None        CXR Results  (Last 48 hours)    None        Medical Decision Making   I am the first provider for this patient. I reviewed the vital signs, available nursing notes, past medical history, past surgical history, family history and social history. Vital Signs-Reviewed the patient's vital signs. Patient Vitals for the past 12 hrs:   Temp Pulse Resp BP SpO2   08/24/19 2147 96.9 °F (36.1 °C) 77 19 122/77 100 %     Pulse Oximetry Analysis - 100% on RA    Records Reviewed: Nursing Notes, Old Medical Records, Previous Radiology Studies, Previous Laboratory Studies and     Provider Notes (Medical Decision Making):   DDx: Lumbar radiculopathy, peripheral neuropathy, bilateral foot pain    ED Course:   Initial assessment performed. The patients presenting problems have been discussed, and they are in agreement with the care plan formulated and outlined with them. I have encouraged them to ask questions as they arise throughout their visit. Disposition:  Discharge    PLAN:  1. Discharge Medication List as of 8/24/2019 11:07 PM      START taking these medications    Details   ibuprofen (MOTRIN) 800 mg tablet Take 1 Tab by mouth every eight (8) hours as needed for Pain for up to 7 days. , Normal, Disp-20 Tab, R-0      cyclobenzaprine (FLEXERIL) 10 mg tablet Take 1 Tab by mouth three (3) times daily as needed for Muscle Spasm(s). , Normal, Disp-20 Tab, R-0         CONTINUE these medications which have NOT CHANGED    Details   butalbital-acetaminophen-caffeine (FIORICET, ESGIC) -40 mg per tablet Take 2 Tabs by mouth every eight (8) hours as needed for Pain or Headache.  Indications: migraine headache, Tension Headache, Normal, Disp-50 Tab, R-11      tiZANidine (ZANAFLEX) 4 mg tablet Take 0.5-1 Tabs by mouth three (3) times daily as needed for Pain., Normal, Disp-100 Tab, R-5      diclofenac EC (VOLTAREN) 75 mg EC tablet Take 75 mg by mouth. As needed, Historical Med      ALPRAZolam (XANAX) 1 mg tablet 1 mg two (2) times a day., Historical Med, R-0      buPROPion XL (WELLBUTRIN XL) 300 mg XL tablet take 1 tablet by mouth once daily, Historical Med, R-0      escitalopram oxalate (LEXAPRO) 20 mg tablet escitalopram 20 mg tablet daily, Historical Med      gabapentin (NEURONTIN) 400 mg capsule 400 mg three (3) times daily. , Historical Med      methylphenidate HCl (RITALIN) 10 mg tablet take 1 tablet by mouth twice a day, Historical Med, R-0      pantoprazole (PROTONIX) 40 mg tablet 40 mg daily. , Historical Med, R-0      simvastatin (ZOCOR) 20 mg tablet 20 mg nightly., Historical Med      zolpidem (AMBIEN) 10 mg tablet 10 mg nightly., Historical Med, R-0      ferrous sulfate 325 mg (65 mg iron) tablet Take  by mouth Daily (before breakfast). , Historical Med      FIBER, DEXTRIN, PO Take 3 mg by mouth three (3) times daily. , Historical Med      cholecalciferol, vitamin D3, (VITAMIN D3) 2,000 unit tab Take  by mouth daily. , Historical Med           2. Follow-up Information     Follow up With Specialties Details Why Contact Info    Nerissa Matias MD Neurology Schedule an appointment as soon as possible for a visit NEUROLOGY: call to schedule follow up [address]  [phone]          Return to ED if worse     Diagnosis     Clinical Impression:   1. Paresthesia of both feet    2. Lumbar radiculopathy    3.  History of lumbar laminectomyx2

## 2019-08-25 NOTE — ED NOTES
Pt arrives ambulatory to ED with c/o pain that extends from ankle to foot, sees neurologist dr Hernan España. Symptoms started today, reports back surgeries in the past. \"also I have this small bruise on my side and its never been that dark before and my hands are so dry but I put so much lotion on. \"

## 2019-08-26 ENCOUNTER — TELEPHONE (OUTPATIENT)
Dept: NEUROLOGY | Age: 59
End: 2019-08-26

## 2019-08-26 NOTE — TELEPHONE ENCOUNTER
Patient was seen at [facility] ER Saturday and was advised to libertad an apptmt with

## 2019-08-26 NOTE — TELEPHONE ENCOUNTER
Tim Buck, we will have to work her in when I get back from [place], tell her I am out of town until the middle of September

## 2019-09-11 ENCOUNTER — TELEPHONE (OUTPATIENT)
Dept: NEUROLOGY | Age: 59
End: 2019-09-11

## 2019-09-24 LAB — AMB DEXA, EXTERNAL: NORMAL

## 2019-10-04 ENCOUNTER — TELEPHONE (OUTPATIENT)
Dept: NEUROLOGY | Age: 59
End: 2019-10-04

## 2019-10-04 NOTE — TELEPHONE ENCOUNTER
----- Message from Nyla  sent at 10/4/2019 12:17 PM EDT -----  Regarding: Dr Arredondo/telephone  Patient return call    Caller's first and last name and relationship (if not the patient):      Best contact number(s):[phone]      Whose call is being returned:   The nurse    Details to clarify the request:  Regarding an referral that was sent from her MD to try to get back in to see Dr Reji Lyle before her current appt 20 she is now dx with paresthesia of the skin in her lower extremities, after her [facility] ER visit last 19 , they have been missing each other calls she is trying to get an appt     Ivory Flint

## 2019-10-08 ENCOUNTER — TELEPHONE (OUTPATIENT)
Dept: NEUROLOGY | Age: 59
End: 2019-10-08

## 2019-10-09 ENCOUNTER — OFFICE VISIT (OUTPATIENT)
Dept: NEUROLOGY | Age: 59
End: 2019-10-09

## 2019-10-09 VITALS
HEART RATE: 77 BPM | RESPIRATION RATE: 12 BRPM | HEIGHT: 68 IN | BODY MASS INDEX: 32.43 KG/M2 | DIASTOLIC BLOOD PRESSURE: 60 MMHG | SYSTOLIC BLOOD PRESSURE: 106 MMHG | OXYGEN SATURATION: 98 % | WEIGHT: 214 LBS

## 2019-10-09 DIAGNOSIS — M79.7 FIBROMYALGIA: ICD-10-CM

## 2019-10-09 DIAGNOSIS — G56.21 ULNAR NEUROPATHY AT ELBOW OF RIGHT UPPER EXTREMITY: Primary | ICD-10-CM

## 2019-10-09 DIAGNOSIS — G43.109 MIGRAINE WITH AURA AND WITHOUT STATUS MIGRAINOSUS, NOT INTRACTABLE: ICD-10-CM

## 2019-10-09 DIAGNOSIS — M54.16 LUMBAR BACK PAIN WITH RADICULOPATHY AFFECTING LEFT LOWER EXTREMITY: ICD-10-CM

## 2019-10-09 DIAGNOSIS — M77.41 METATARSALGIA OF BOTH FEET: ICD-10-CM

## 2019-10-09 DIAGNOSIS — G56.22 ULNAR NEUROPATHY AT ELBOW OF LEFT UPPER EXTREMITY: ICD-10-CM

## 2019-10-09 DIAGNOSIS — G44.209 TENSION VASCULAR HEADACHE: ICD-10-CM

## 2019-10-09 DIAGNOSIS — M54.16 LUMBAR BACK PAIN WITH RADICULOPATHY AFFECTING RIGHT LOWER EXTREMITY: ICD-10-CM

## 2019-10-09 DIAGNOSIS — M47.22 CERVICAL RADICULOPATHY DUE TO DEGENERATIVE JOINT DISEASE OF SPINE: ICD-10-CM

## 2019-10-09 DIAGNOSIS — M77.42 METATARSALGIA OF BOTH FEET: ICD-10-CM

## 2019-10-09 DIAGNOSIS — Z98.890 HISTORY OF LUMBAR LAMINECTOMY: ICD-10-CM

## 2019-10-09 RX ORDER — DEXLANSOPRAZOLE 60 MG/1
CAPSULE, DELAYED RELEASE ORAL
COMMUNITY
End: 2021-07-06

## 2019-10-09 RX ORDER — GABAPENTIN 400 MG/1
CAPSULE ORAL
Qty: 360 CAP | Refills: 4 | Status: SHIPPED | OUTPATIENT
Start: 2019-10-09 | End: 2021-06-21 | Stop reason: SDUPTHER

## 2019-10-09 NOTE — PATIENT INSTRUCTIONS
A Healthy Lifestyle: Care Instructions Your Care Instructions A healthy lifestyle can help you feel good, stay at a healthy weight, and have plenty of energy for both work and play. A healthy lifestyle is something you can share with your whole family. A healthy lifestyle also can lower your risk for serious health problems, such as high blood pressure, heart disease, and diabetes. You can follow a few steps listed below to improve your health and the health of your family. Follow-up care is a key part of your treatment and safety. Be sure to make and go to all appointments, and call your doctor if you are having problems. It's also a good idea to know your test results and keep a list of the medicines you take. How can you care for yourself at home? · Do not eat too much sugar, fat, or fast foods. You can still have dessert and treats now and then. The goal is moderation. · Start small to improve your eating habits. Pay attention to portion sizes, drink less juice and soda pop, and eat more fruits and vegetables. ? Eat a healthy amount of food. A 3-ounce serving of meat, for example, is about the size of a deck of cards. Fill the rest of your plate with vegetables and whole grains. ? Limit the amount of soda and sports drinks you have every day. Drink more water when you are thirsty. ? Eat at least 5 servings of fruits and vegetables every day. It may seem like a lot, but it is not hard to reach this goal. A serving or helping is 1 piece of fruit, 1 cup of vegetables, or 2 cups of leafy, raw vegetables. Have an apple or some carrot sticks as an afternoon snack instead of a candy bar. Try to have fruits and/or vegetables at every meal. 
· Make exercise part of your daily routine. You may want to start with simple activities, such as walking, bicycling, or slow swimming. Try to be active 30 to 60 minutes every day.  You do not need to do all 30 to 60 minutes all at once. For example, you can exercise 3 times a day for 10 or 20 minutes. Moderate exercise is safe for most people, but it is always a good idea to talk to your doctor before starting an exercise program. 
· Keep moving. [place] the lawn, work in the garden, or [employer]. Take the stairs instead of the elevator at work. · If you smoke, quit. People who smoke have an increased risk for heart attack, stroke, cancer, and other lung illnesses. Quitting is hard, but there are ways to boost your chance of quitting tobacco for good. ? Use nicotine gum, patches, or lozenges. ? Ask your doctor about stop-smoking programs and medicines. ? Keep trying. In addition to reducing your risk of diseases in the future, you will notice some benefits soon after you stop using tobacco. If you have shortness of breath or asthma symptoms, they will likely get better within a few weeks after you quit. · Limit how much alcohol you drink. Moderate amounts of alcohol (up to 2 drinks a day for men, 1 drink a day for women) are okay. But drinking too much can lead to liver problems, high blood pressure, and other health problems. Family health If you have a family, there are many things you can do together to improve your health. · Eat meals together as a family as often as possible. · Eat healthy foods. This includes fruits, vegetables, lean meats and dairy, and whole grains. · Include your family in your fitness plan. Most people think of activities such as jogging or tennis as the way to fitness, but there are many ways you and your family can be more active. Anything that makes you breathe hard and gets your heart pumping is exercise. Here are some tips: 
? Walk to do errands or to take your child to school or the bus. 
? Go for a family bike ride after dinner instead of watching TV. Where can you learn more? Go to http://kimmie-subha.info/. Enter K175 in the search box to learn more about \"A Healthy Lifestyle: Care Instructions. \" Current as of: May 28, 2019 Content Version: 12.2 © 6271-2784 [employer], [employer]. Care instructions adapted under license by [employer] (which disclaims liability or warranty for this information). If you have questions about a medical condition or this instruction, always ask your healthcare professional. [address] any warranty or liability for your use of this information. Office Policies 
 
o Phone calls/patient messages: Please allow up to 24 hours for someone in the office to contact you about your call or message. Be mindful your provider may be out of the office or your message may require further review. We encourage you to use [employer] for your messages as this is a faster, more efficient way to communicate with our office 
 
o Medication Refills: 
Prescription medications require up to 48 business hours to process. We encourage you to use [employer] for your refills. For controlled medications: Please allow up to 72 business hours to process. Certain medications may require you to  a written prescription at our office. NO narcotic/controlled medications will be prescribed after 4pm Monday through Friday or on weekends 
 
o Form/Paperwork Completion: We ask that you allow 7-14 business days. You may also download your forms to [employer] to have your doctor print off.

## 2019-10-10 ENCOUNTER — DOCUMENTATION ONLY (OUTPATIENT)
Dept: NEUROLOGY | Age: 59
End: 2019-10-10

## 2019-10-11 NOTE — PROGRESS NOTES
Consult  REFERRED BY:  Margoth Link MD    CHIEF COMPLAINT: New severe headache      Subjective:     Richard Wagoner is a 61 y.o. right-handed  female we are asked to evaluate at the referral of Dr. Malina Bartlett for new problem of increasing foot pain in both feet that came on several weeks ago and seem relatively severe, when she went to the emergency room, and was told that she had probably arthritis, and was given an anti-inflammatory, but slowly seemed to get better. The pain was worse when she tried to walk on her feet. She wondered if that was some exacerbation of her neuropathy but she had no real new numbness, no new weakness, just the burning pain her examination shows very tender metatarsal heads bilaterally, and possibly Kim's neuroma between the third and fourth toe bilaterally. I told the patient I do not think her symptoms are related to her back problem and radiculopathy but that history. She also has a problem of occasional muscle spasms in her back and stiffness in the back difficulty moving at times, from her post lumbar laminectomy syndrome, and patient wants to know what she can do about this, as she is already doing the stretching exercises which helps some but at times the muscles tighten up quite a bit. We sent in a prescription for Zanaflex for the patient, taking 2 to 4 mg 2-3 times a day as needed. The patient was able to get relief after months of physical therapy on her back, and is trying to avoid surgery. She has had 2 operations on her back years ago, and is trying to avoid a third operation. We sent in tramadol for one flareup, that gradually got better with therapy, but the tramadol did not do much and we will keep her off narcotics if possible. She is already on Neurontin 400 mg 3 times a day for her chronic pain and radiculopathy and we refilled that today. .   She has had 2 previous lumbar laminectomies, and is already getting some therapy on her back for chronic post lumbar laminectomy syndrome and even had injections in her back in the past.  Patient had an EMG study of both legs in December 2018, that showed no clear evidence of motor radiculopathy, and an MRI scan showed a lot of degenerative changes and postop changes but no surgical lesions on the lumbar spine. She was treated for piriformis syndrome bilaterally and lumbar radiculopathy by the physical therapist.  She continues taking her anti-inflammatory which is helping quite a bit so far and doing a stretching exercise program.  She does have headache in the bifrontal head region that is fairly frequent and that is associated with increasing numbness radiating into her hands, mainly along the little finger and palm side of the hand, right side greater than left. She had an EMG study of both arms 18 months ago that was normal, showing no clear evidence of ulnar neuropathy or carpal tunnel or cervical radiculopathy. She supposedly has a known cervical disc disease in the neck. She denies any fever, meningismus, trauma, increased stress or depression or tension as a cause of her headaches. She is not aware of any precipitating or relieving factors for the headache. She is already on gabapentin 400 mg 3 times a day, Ritalin 10 mg twice a day, Xanax 1 mg twice a day, Wellbutrin 300 mg once a day, Lexapro 20 mg a day because of her depression. She is retired on disability because of her depression. She had a previous lumbar laminectomy twice in the past, and bilateral carpal tunnel syndrome surgery, and trigger finger of her left thumb that was fixed in 2013. She had gallbladder surgery in 2011. She had a previous history of migraine years ago, and had a normal MRI of the brain that was 10-15 years ago and a normal CT then. She took Topamax then, but is looking for medication that she can take on a as needed basis now.   She has tried Imitrex in the past with partial relief but that does not seem to stop all the headaches now. She has had no real cognitive issues, no visual loss, no other cranial nerve problems, but the headaches are getting very bad and she has difficulty doing her daily function with these headaches. She has not had any clear gait instability. She has some chronic discomfort in the posterior craniocervical junction from her neck problems in addition. Patient has chronic numbness in her left foot and loss of ankle jerks because of her back operation. Past Medical History:   Diagnosis Date    Chronic mental illness     Lumbar back pain with radiculopathy affecting left lower extremity     Lumbar back pain with radiculopathy affecting right lower extremity     Migraine       Past Surgical History:   Procedure Laterality Date    HX CHOLECYSTECTOMY  2011    HX LUMBAR LAMINECTOMY      HX ORTHOPAEDIC  2014, 1989, 1991    Lumbar Laminectomy    HX ORTHOPAEDIC  2011    CTS both hands    HX ORTHOPAEDIC  2013    left thumb      Family History   Problem Relation Age of Onset   24 [facility] Cancer Mother         lung    High Cholesterol Mother     Dementia Father     Heart Disease Brother       Social History     Tobacco Use    Smoking status: Never Smoker    Smokeless tobacco: Never Used   Substance Use Topics    Alcohol use: No         Current Outpatient Medications:     dexlansoprazole (DEXILANT) 60 mg CpDB capsule (delayed release), Take  by mouth., Disp: , Rfl:     gabapentin (NEURONTIN) 400 mg capsule, 1 CAP PO BID and 2 PO QHS, Disp: 360 Cap, Rfl: 4    butalbital-acetaminophen-caffeine (FIORICET, ESGIC) -40 mg per tablet, Take 2 Tabs by mouth every eight (8) hours as needed for Pain or Headache. Indications: migraine headache, Tension Headache, Disp: 50 Tab, Rfl: 11    tiZANidine (ZANAFLEX) 4 mg tablet, Take 0.5-1 Tabs by mouth three (3) times daily as needed for Pain., Disp: 100 Tab, Rfl: 5    diclofenac EC (VOLTAREN) 75 mg EC tablet, Take 75 mg by mouth.  As needed, Disp: , Rfl:   ALPRAZolam (XANAX) 1 mg tablet, 1 mg nightly., Disp: , Rfl: 0    buPROPion XL (WELLBUTRIN XL) 300 mg XL tablet, take 1 tablet by mouth once daily, Disp: , Rfl: 0    escitalopram oxalate (LEXAPRO) 20 mg tablet, escitalopram 20 mg tablet daily, Disp: , Rfl:     methylphenidate HCl (RITALIN) 10 mg tablet, Take 10 mg by mouth daily. , Disp: , Rfl: 0    simvastatin (ZOCOR) 20 mg tablet, 20 mg nightly., Disp: , Rfl:     zolpidem (AMBIEN) 10 mg tablet, 10 mg nightly., Disp: , Rfl: 0    ferrous sulfate 325 mg (65 mg iron) tablet, Take  by mouth Daily (before breakfast). , Disp: , Rfl:     FIBER, DEXTRIN, PO, Take 3 mg by mouth three (3) times daily. , Disp: , Rfl:     cholecalciferol, vitamin D3, (VITAMIN D3) 2,000 unit tab, Take  by mouth daily. , Disp: , Rfl:         Allergies   Allergen Reactions    Clindamycin Hcl Diarrhea and Other (comments)     C-diff    Penicillins Other (comments)     Body aches    Prednisone Other (comments)     hyper    Sulfa (Sulfonamide Antibiotics) Unknown (comments)        Review of Systems:  A comprehensive review of systems was negative except for: Musculoskeletal: positive for myalgias, arthralgias and stiff joints  Neurological: positive for headaches  Behvioral/Psych: positive for anxiety and depression   Vitals:    10/09/19 1506   BP: 106/60   Pulse: 77   Resp: 12   SpO2: 98%   Weight: 214 lb (97.1 kg)   Height: 5' 8\" (1.727 m)     Objective:     I      NEUROLOGICAL EXAM:    Appearance: The patient is well developed, well nourished, provides a coherent history and is in no acute distress. Mental Status: Oriented to time, place and person, and the president, cognitive function is normal and speech is fluent and no aphasia or dysarthria. Mood and affect appropriate but appears depressed and anxious. Cranial Nerves:   Intact visual fields. Fundi are benign, discs are flat but fundi poorly seen. EDIL, EOM's full, no nystagmus, no ptosis.  Facial sensation is normal. Corneal reflexes are not tested. Facial movement is symmetric. Hearing is normal bilaterally. Palate is midline with normal sternocleidomastoid and trapezius muscles are normal. Tongue is midline. Neck without meningismus or bruits  Temporal arteries are not tender or enlarged   Motor:  5/5 strength in upper and lower proximal and distal muscles. Normal bulk and tone. No fasciculations  Rapid alternating movement is a bit slow but symmetric in all extremities. Reflexes:   Deep tendon reflexes 2+/4 and symmetrical, except for absent left ankle jerks because of her previous back operation. No babinski or clonus present  Patient has a Tinel's over both ulnar nerves at the elbows bilaterally suggesting tardy ulnar palsy's bilaterally. Sensory:   Normal to touch, pinprick and vibration and temperature. DSS is intact   Gait:  Abnormal gait as patient walks slowly due to her back pain. Tremor:   No tremor noted. Cerebellar:  No cerebellar signs present on finger-nose-finger exam, and she has a mildly abnormal Romberg and tandem. Neurovascular:  Normal heart sounds and regular rhythm, peripheral pulses decreased, and no carotid bruits. Assessment:       ICD-10-CM ICD-9-CM    1. Ulnar neuropathy at elbow of right upper extremity G56.21 354.2 gabapentin (NEURONTIN) 400 mg capsule   2. Ulnar neuropathy at elbow of left upper extremity G56.22 354.2 gabapentin (NEURONTIN) 400 mg capsule   3. Migraine with aura and without status migrainosus, not intractable G43.109 346.00 gabapentin (NEURONTIN) 400 mg capsule   4. Lumbar back pain with radiculopathy affecting right lower extremity M54.16 724.4 gabapentin (NEURONTIN) 400 mg capsule   5. History of lumbar laminectomyx2 Z98.890 V45.89 gabapentin (NEURONTIN) 400 mg capsule   6. Lumbar back pain with radiculopathy affecting left lower extremity M54.16 724.4 gabapentin (NEURONTIN) 400 mg capsule   7.  Fibromyalgia M79.7 729.1 gabapentin (NEURONTIN) 400 mg capsule 8. Cervical radiculopathy due to degenerative joint disease of spine M47.22 721.0 gabapentin (NEURONTIN) 400 mg capsule   9. Tension vascular headache G44.209 307.81 gabapentin (NEURONTIN) 400 mg capsule   10. Metatarsalgia of both feet M77.41 726.70 gabapentin (NEURONTIN) 400 mg capsule    M77.42       Active Problems:    * No active hospital problems. *      Plan:     Patient with new problem of increasing pain in her feet that is burning but not associated with new weakness or numbness, and I do not think she is having exacerbations of her post lumbar laminectomy, but she most likely is having metatarsalgia and possibly even bilateral Kim's neuroma. She is advised to take padded shoes, see a podiatrist of her symptoms persist and try to stay off her feet as much as possible when they hurt. She is to continue her gabapentin 100 mg 3 times a day for her post lumbar laminectomy pain. Patient with new problem of difficulty with muscle spasms and stiffness, and wanting a muscle relaxant so we gave her Zanaflex 4 mg to take 1/2 to 1 tablet 2-3 times a day as needed. This has helped significantly, and that was recently refilled for her. We advised the patient to continue her stretching exercises to relieve the muscle spasms. She is doing a little better after extensive therapy for her piriformis syndrome bilaterally and post lumbar laminectomy syndrome with radiculopathy in both legs  Her EMG study showed no clear motor radiculopathy in either leg, and was essentially normal, MRI scan showed extensive degenerative changes, but no surgical lesions she will try to avoid surgery continue her conservative management  We will give her Fioricet to use as needed for the headache, she had an MRI scan, and STEPHY and a sed rate to rule out temporal arteritis or other causes of her headaches that were normal  She does not want to try preventive therapy at this time for her headaches.   Because of her numbness in both hands in the ulnar distribution she had an EMG study of both arms that was normal   She does have extensive arthritis in her neck, but does not appear to have surgical lesions there either. She will check my chart for results of her tests, or call us, follow-up will be arranged in 6 months time or earlier as needed. 35 minutes spent with patient today going over her problems discussing her diagnosis prognosis and treatment options with her in detail. Signed By: Scooter Mar MD     October 10, 2019       CC: Malvin Sofia MD  FAX: [phone]    This note will not be viewable in [address]

## 2020-02-20 ENCOUNTER — ED HISTORICAL/CONVERTED ENCOUNTER (OUTPATIENT)
Dept: OTHER | Age: 60
End: 2020-02-20

## 2020-03-10 ENCOUNTER — IP HISTORICAL/CONVERTED ENCOUNTER (OUTPATIENT)
Dept: OTHER | Age: 60
End: 2020-03-10

## 2020-03-10 LAB — CREATININE, EXTERNAL: 1.27

## 2021-06-21 ENCOUNTER — OFFICE VISIT (OUTPATIENT)
Dept: FAMILY MEDICINE CLINIC | Age: 61
End: 2021-06-21
Payer: MEDICARE

## 2021-06-21 VITALS
BODY MASS INDEX: 20.16 KG/M2 | HEART RATE: 95 BPM | OXYGEN SATURATION: 98 % | WEIGHT: 133 LBS | DIASTOLIC BLOOD PRESSURE: 70 MMHG | TEMPERATURE: 97.5 F | SYSTOLIC BLOOD PRESSURE: 115 MMHG | HEIGHT: 68 IN

## 2021-06-21 DIAGNOSIS — Z98.890 HISTORY OF LUMBAR LAMINECTOMY: ICD-10-CM

## 2021-06-21 DIAGNOSIS — M79.7 FIBROMYALGIA: ICD-10-CM

## 2021-06-21 DIAGNOSIS — G44.209 TENSION VASCULAR HEADACHE: ICD-10-CM

## 2021-06-21 DIAGNOSIS — M77.41 METATARSALGIA OF BOTH FEET: ICD-10-CM

## 2021-06-21 DIAGNOSIS — M54.16 LUMBAR BACK PAIN WITH RADICULOPATHY AFFECTING LEFT LOWER EXTREMITY: ICD-10-CM

## 2021-06-21 DIAGNOSIS — M77.42 METATARSALGIA OF BOTH FEET: ICD-10-CM

## 2021-06-21 DIAGNOSIS — M47.22 CERVICAL RADICULOPATHY DUE TO DEGENERATIVE JOINT DISEASE OF SPINE: ICD-10-CM

## 2021-06-21 DIAGNOSIS — M54.16 LUMBAR BACK PAIN WITH RADICULOPATHY AFFECTING RIGHT LOWER EXTREMITY: ICD-10-CM

## 2021-06-21 DIAGNOSIS — F32.A DEPRESSION, UNSPECIFIED DEPRESSION TYPE: ICD-10-CM

## 2021-06-21 DIAGNOSIS — G56.21 ULNAR NEUROPATHY AT ELBOW OF RIGHT UPPER EXTREMITY: ICD-10-CM

## 2021-06-21 DIAGNOSIS — F41.9 ANXIETY: Primary | ICD-10-CM

## 2021-06-21 DIAGNOSIS — N39.0 URINARY TRACT INFECTION WITHOUT HEMATURIA, SITE UNSPECIFIED: ICD-10-CM

## 2021-06-21 DIAGNOSIS — G56.22 ULNAR NEUROPATHY AT ELBOW OF LEFT UPPER EXTREMITY: ICD-10-CM

## 2021-06-21 DIAGNOSIS — F51.05 INSOMNIA DUE TO OTHER MENTAL DISORDER: ICD-10-CM

## 2021-06-21 DIAGNOSIS — G43.109 MIGRAINE WITH AURA AND WITHOUT STATUS MIGRAINOSUS, NOT INTRACTABLE: ICD-10-CM

## 2021-06-21 DIAGNOSIS — F99 INSOMNIA DUE TO OTHER MENTAL DISORDER: ICD-10-CM

## 2021-06-21 LAB
BILIRUB UR QL STRIP: NEGATIVE
GLUCOSE UR-MCNC: NEGATIVE MG/DL
KETONES P FAST UR STRIP-MCNC: NEGATIVE MG/DL
PH UR STRIP: 7 [PH] (ref 4.6–8)
PROT UR QL STRIP: NEGATIVE
SP GR UR STRIP: 1.03 (ref 1–1.03)
UA UROBILINOGEN AMB POC: NORMAL (ref 0.2–1)
URINALYSIS CLARITY POC: CLEAR
URINALYSIS COLOR POC: YELLOW
URINE BLOOD POC: NEGATIVE
URINE LEUKOCYTES POC: NEGATIVE
URINE NITRITES POC: NEGATIVE

## 2021-06-21 PROCEDURE — G8431 POS CLIN DEPRES SCRN F/U DOC: HCPCS | Performed by: STUDENT IN AN ORGANIZED HEALTH CARE EDUCATION/TRAINING PROGRAM

## 2021-06-21 PROCEDURE — 99214 OFFICE O/P EST MOD 30 MIN: CPT | Performed by: STUDENT IN AN ORGANIZED HEALTH CARE EDUCATION/TRAINING PROGRAM

## 2021-06-21 PROCEDURE — 81003 URINALYSIS AUTO W/O SCOPE: CPT | Performed by: STUDENT IN AN ORGANIZED HEALTH CARE EDUCATION/TRAINING PROGRAM

## 2021-06-21 PROCEDURE — G8420 CALC BMI NORM PARAMETERS: HCPCS | Performed by: STUDENT IN AN ORGANIZED HEALTH CARE EDUCATION/TRAINING PROGRAM

## 2021-06-21 PROCEDURE — 3017F COLORECTAL CA SCREEN DOC REV: CPT | Performed by: STUDENT IN AN ORGANIZED HEALTH CARE EDUCATION/TRAINING PROGRAM

## 2021-06-21 PROCEDURE — G8427 DOCREV CUR MEDS BY ELIG CLIN: HCPCS | Performed by: STUDENT IN AN ORGANIZED HEALTH CARE EDUCATION/TRAINING PROGRAM

## 2021-06-21 RX ORDER — ESCITALOPRAM OXALATE 20 MG/1
TABLET ORAL
Qty: 90 TABLET | Refills: 0 | Status: SHIPPED | OUTPATIENT
Start: 2021-06-21 | End: 2021-06-25 | Stop reason: SDUPTHER

## 2021-06-21 RX ORDER — GABAPENTIN 400 MG/1
400 CAPSULE ORAL 2 TIMES DAILY
Qty: 180 CAPSULE | Refills: 0 | Status: SHIPPED | OUTPATIENT
Start: 2021-06-21 | End: 2021-06-25 | Stop reason: SDUPTHER

## 2021-06-21 RX ORDER — TRAZODONE HYDROCHLORIDE 50 MG/1
50 TABLET ORAL
Qty: 90 TABLET | Refills: 0 | Status: SHIPPED | OUTPATIENT
Start: 2021-06-21 | End: 2021-06-25 | Stop reason: SDUPTHER

## 2021-06-21 NOTE — PROGRESS NOTES
Subjective:     Chief Complaint   Patient presents with    Bladder Infection     HPI:  Savi Landry is a 64 y.o. female was brought into the office by bedside due to clinical palpation and she is not sleeping, has not slept in about a year and a half, only eating once a day, and possible bladder infection. Patient has a significant psychiatric issue which includes depression, anxiety, obsessive-compulsive disorder, who was hospitalized April 2020 for these issues, and hallucinations. She was following with psychiatrist, who unfortunately passed away couple months ago, she has not seen a psychiatrist since April 2020. She is on multiple medications, currently was only on 600 mg gabapentin nightly, and 20 mg of Lexapro daily. She was on many other medications, which she can see on the medication list, which is currently not on. Denies dysuria, or other abdominal pain. According to her brother during one of her previous psychiatric events mentioned hallucinations, she was actually found to have a UTI here in the office. He was worried that she may be having a UTI again when she is told that she has not been sleeping in a year and a half and not eating much. According to him today she is very lucid and acting normally. Was AOx3 for him today, and knew the president. Was on Ambien for insomnia. Has not been taking anything. She is okay with trying trazodone. Since she was about 222 pounds on EMR about 2 years ago, and now is down to 133. States he needs once a day. States she started eating TV meals, and she does not cook. She lives alone in a house she owns. Denies any rectal bleeding. Needs refill of gabapentin along with Lexapro. Looks like was on gabapentin due to neuropathy. Reviewed previous neurology note. She was previously on 400 mg twice daily, but was changed to 600 mg nightly by somebody may be psychiatrist.  Maycol Meehan of bilateral foot numbness.   Today she did not mention having any discomfort due to her cervical radiculopathy. Past Medical History:   Diagnosis Date    Chronic mental illness     Lumbar back pain with radiculopathy affecting left lower extremity     Lumbar back pain with radiculopathy affecting right lower extremity     Migraine      Family History   Problem Relation Age of Onset    Cancer Mother         lung    High Cholesterol Mother     Dementia Father     Heart Disease Brother      Social History     Socioeconomic History    Marital status: SINGLE     Spouse name: Not on file    Number of children: Not on file    Years of education: Not on file    Highest education level: Not on file   Occupational History    Not on file   Tobacco Use    Smoking status: Never Smoker    Smokeless tobacco: Never Used   Substance and Sexual Activity    Alcohol use: No    Drug use: No    Sexual activity: Not on file   Other Topics Concern    Not on file   Social History Narrative    Not on file     Social Determinants of Health     Financial Resource Strain:     Difficulty of Paying Living Expenses:    Food Insecurity:     Worried About Running Out of Food in the Last Year:     Ran Out of Food in the Last Year:    Transportation Needs:     Lack of Transportation (Medical):      Lack of Transportation (Non-Medical):    Physical Activity:     Days of Exercise per Week:     Minutes of Exercise per Session:    Stress:     Feeling of Stress :    Social Connections:     Frequency of Communication with Friends and Family:     Frequency of Social Gatherings with Friends and Family:     Attends Buddhist Services:     Active Member of Clubs or Organizations:     Attends Club or Organization Meetings:     Marital Status:    Intimate Partner Violence:     Fear of Current or Ex-Partner:     Emotionally Abused:     Physically Abused:     Sexually Abused:      Current Outpatient Medications on File Prior to Visit   Medication Sig Dispense Refill    dexlansoprazole (DEXILANT) 60 mg CpDB capsule (delayed release) Take  by mouth. (Patient not taking: Reported on 6/21/2021)      [DISCONTINUED] gabapentin (NEURONTIN) 400 mg capsule 1 CAP PO BID and 2 PO QHS (Patient taking differently: Take 600 mg by mouth. 1 CAP PO BID) 360 Cap 4    butalbital-acetaminophen-caffeine (FIORICET, ESGIC) -40 mg per tablet Take 2 Tabs by mouth every eight (8) hours as needed for Pain or Headache. Indications: migraine headache, Tension Headache (Patient not taking: Reported on 6/21/2021) 50 Tab 11    tiZANidine (ZANAFLEX) 4 mg tablet Take 0.5-1 Tabs by mouth three (3) times daily as needed for Pain. (Patient not taking: Reported on 6/21/2021) 100 Tab 5    diclofenac EC (VOLTAREN) 75 mg EC tablet Take 75 mg by mouth. As needed (Patient not taking: Reported on 6/21/2021)      ALPRAZolam (XANAX) 1 mg tablet 1 mg nightly. (Patient not taking: Reported on 6/21/2021)  0    buPROPion XL (WELLBUTRIN XL) 300 mg XL tablet take 1 tablet by mouth once daily (Patient not taking: Reported on 6/21/2021)  0    methylphenidate HCl (RITALIN) 10 mg tablet Take 10 mg by mouth daily. (Patient not taking: Reported on 6/21/2021)  0    simvastatin (ZOCOR) 20 mg tablet 20 mg nightly. (Patient not taking: Reported on 6/21/2021)      zolpidem (AMBIEN) 10 mg tablet 10 mg nightly. (Patient not taking: Reported on 6/21/2021)  0    ferrous sulfate 325 mg (65 mg iron) tablet Take  by mouth Daily (before breakfast). (Patient not taking: Reported on 6/21/2021)      FIBER, DEXTRIN, PO Take 3 mg by mouth three (3) times daily. (Patient not taking: Reported on 6/21/2021)      cholecalciferol, vitamin D3, (VITAMIN D3) 2,000 unit tab Take  by mouth daily. (Patient not taking: Reported on 6/21/2021)      [DISCONTINUED] escitalopram oxalate (LEXAPRO) 20 mg tablet escitalopram 20 mg tablet daily       No current facility-administered medications on file prior to visit.      Allergies   Allergen Reactions    Clindamycin Hcl Diarrhea and Other (comments)     C-diff    Penicillins Other (comments)     Body aches    Prednisone Other (comments)     hyper    Sulfa (Sulfonamide Antibiotics) Unknown (comments)     ROS  As stated in HPI      Objective:     Vitals:    06/21/21 1049   BP: 115/70   Pulse: 95   Temp: 97.5 °F (36.4 °C)   TempSrc: Temporal   SpO2: 98%   Weight: 133 lb (60.3 kg)   Height: 5' 8\" (1.727 m)     Physical Exam  Vitals reviewed. Constitutional:       Appearance: Normal appearance. HENT:      Head: Normocephalic and atraumatic. Cardiovascular:      Rate and Rhythm: Normal rate. Pulmonary:      Effort: Pulmonary effort is normal.   Neurological:      Mental Status: She is alert and oriented to person, place, and time. Psychiatric:         Mood and Affect: Mood normal.         Behavior: Behavior normal.            Assessment/Plan:       ICD-10-CM ICD-9-CM    1. Anxiety  F41.9 300.00 escitalopram oxalate (LEXAPRO) 20 mg tablet   2. Tension vascular headache  G44.209 307.81 gabapentin (NEURONTIN) 400 mg capsule   3. Cervical radiculopathy due to degenerative joint disease of spine  M47.22 721.0 gabapentin (NEURONTIN) 400 mg capsule   4. Ulnar neuropathy at elbow of right upper extremity  G56.21 354.2 gabapentin (NEURONTIN) 400 mg capsule   5. Ulnar neuropathy at elbow of left upper extremity  G56.22 354.2 gabapentin (NEURONTIN) 400 mg capsule   6. Migraine with aura and without status migrainosus, not intractable  G43.109 346.00 gabapentin (NEURONTIN) 400 mg capsule   7. Lumbar back pain with radiculopathy affecting right lower extremity  M54.16 724.4 gabapentin (NEURONTIN) 400 mg capsule   8. History of lumbar laminectomyx2  Z98.890 V45.89 gabapentin (NEURONTIN) 400 mg capsule   9. Lumbar back pain with radiculopathy affecting left lower extremity  M54.16 724.4 gabapentin (NEURONTIN) 400 mg capsule   10. Fibromyalgia  M79.7 729.1 gabapentin (NEURONTIN) 400 mg capsule   11.  Metatarsalgia of both feet  M77.41 726.70 gabapentin (NEURONTIN) 400 mg capsule    M77.42     12. Depression, unspecified depression type  F32.9 311 escitalopram oxalate (LEXAPRO) 20 mg tablet   13. Insomnia due to other mental disorder  F51.05 300.9 traZODone (DESYREL) 50 mg tablet    F99 327.02    14. Urinary tract infection without hematuria, site unspecified  N39.0 599.0 CULTURE, URINE     Depression/anxiety/COPDLexapro reordered. Since we are starting trazodone for insomnia we will restart medication slowly. Will reevaluate next visit, and if doing okay on medications will restart Wellbutrin. Psychiatry will be consulted. Insomniatrazodone ordered    Bilateral foot neuropathy/history of cervical radiculopathygabapentin reordered at previous dose of 400 mg twice daily    Weight losswe will check labs next visit, can consider abdominal CT. Concern for UTIPOCT UA was negative. Will check urine culture. I highly doubt she has a UTI. We will discuss some of the other causes neuropathy and the patient had in the past next visit, specifically the ones in her diagnosis list.    Follow-Up:  Follow-up and Dispositions    · Return in 3 weeks (on 7/12/2021) for Insomnia, Labs/Weightloss, Restarting wellbutrin.           Negar Stanley MD

## 2021-06-21 NOTE — PROGRESS NOTES
Chief Complaint   Patient presents with    Bladder Infection     1. Have you been to the ER, urgent care clinic since your last visit? Hospitalized since your last visit? No    2. Have you seen or consulted any other health care providers outside of the [address] [place] [address] since your last visit? Include any pap smears or colon screening. No     3 most recent PHQ Screens 6/21/2021   Little interest or pleasure in doing things Nearly every day   Feeling down, depressed, irritable, or hopeless Nearly every day   Total Score PHQ 2 6   Trouble falling or staying asleep, or sleeping too much Nearly every day   Feeling tired or having little energy Nearly every day   Poor appetite, weight loss, or overeating Nearly every day   Feeling bad about yourself - or that you are a failure or have let yourself or your family down Nearly every day   Trouble concentrating on things such as school, work, reading, or watching TV Not at all   Moving or speaking so slowly that other people could have noticed; or the opposite being so fidgety that others notice Nearly every day   Thoughts of being better off dead, or hurting yourself in some way Not at all   PHQ 9 Score 21   How difficult have these problems made it for you to do your work, take care of your home and get along with others Extremely difficult     PT here for UTI. PT has RX prescribed by previous psych Dr (whose passed away). PT has severe depression.  Refill req submitted for Gabapentin & Escitalopram.

## 2021-06-23 LAB — BACTERIA UR CULT: NORMAL

## 2021-06-24 ENCOUNTER — TELEPHONE (OUTPATIENT)
Dept: FAMILY MEDICINE CLINIC | Age: 61
End: 2021-06-24

## 2021-06-24 DIAGNOSIS — G56.22 ULNAR NEUROPATHY AT ELBOW OF LEFT UPPER EXTREMITY: ICD-10-CM

## 2021-06-24 DIAGNOSIS — M54.16 LUMBAR BACK PAIN WITH RADICULOPATHY AFFECTING RIGHT LOWER EXTREMITY: ICD-10-CM

## 2021-06-24 DIAGNOSIS — G44.209 TENSION VASCULAR HEADACHE: ICD-10-CM

## 2021-06-24 DIAGNOSIS — F51.05 INSOMNIA DUE TO OTHER MENTAL DISORDER: ICD-10-CM

## 2021-06-24 DIAGNOSIS — M77.41 METATARSALGIA OF BOTH FEET: ICD-10-CM

## 2021-06-24 DIAGNOSIS — F41.9 ANXIETY: ICD-10-CM

## 2021-06-24 DIAGNOSIS — F99 INSOMNIA DUE TO OTHER MENTAL DISORDER: ICD-10-CM

## 2021-06-24 DIAGNOSIS — G43.109 MIGRAINE WITH AURA AND WITHOUT STATUS MIGRAINOSUS, NOT INTRACTABLE: ICD-10-CM

## 2021-06-24 DIAGNOSIS — M77.42 METATARSALGIA OF BOTH FEET: ICD-10-CM

## 2021-06-24 DIAGNOSIS — F32.A DEPRESSION, UNSPECIFIED DEPRESSION TYPE: ICD-10-CM

## 2021-06-24 DIAGNOSIS — Z98.890 HISTORY OF LUMBAR LAMINECTOMY: ICD-10-CM

## 2021-06-24 DIAGNOSIS — M47.22 CERVICAL RADICULOPATHY DUE TO DEGENERATIVE JOINT DISEASE OF SPINE: ICD-10-CM

## 2021-06-24 DIAGNOSIS — G56.21 ULNAR NEUROPATHY AT ELBOW OF RIGHT UPPER EXTREMITY: ICD-10-CM

## 2021-06-24 DIAGNOSIS — M79.7 FIBROMYALGIA: ICD-10-CM

## 2021-06-24 DIAGNOSIS — M54.16 LUMBAR BACK PAIN WITH RADICULOPATHY AFFECTING LEFT LOWER EXTREMITY: ICD-10-CM

## 2021-06-24 NOTE — TELEPHONE ENCOUNTER
Patient states her pharmacy is the [facility] in [facility] instead of [facility] order and wants them sent there please.

## 2021-06-25 RX ORDER — ESCITALOPRAM OXALATE 20 MG/1
TABLET ORAL
Qty: 90 TABLET | Refills: 0 | Status: SHIPPED | OUTPATIENT
Start: 2021-06-25 | End: 2021-09-17 | Stop reason: SDUPTHER

## 2021-06-25 RX ORDER — TRAZODONE HYDROCHLORIDE 50 MG/1
50 TABLET ORAL
Qty: 90 TABLET | Refills: 0 | Status: SHIPPED | OUTPATIENT
Start: 2021-06-25 | End: 2021-08-06 | Stop reason: SDUPTHER

## 2021-06-25 RX ORDER — GABAPENTIN 400 MG/1
400 CAPSULE ORAL 2 TIMES DAILY
Qty: 180 CAPSULE | Refills: 0 | Status: SHIPPED | OUTPATIENT
Start: 2021-06-25 | End: 2021-09-27 | Stop reason: SDUPTHER

## 2021-06-25 NOTE — TELEPHONE ENCOUNTER
Patient's brother advised. He will relay message to his sister. Unable to reach patient x3. Voice mailbox is full.

## 2021-07-06 ENCOUNTER — OFFICE VISIT (OUTPATIENT)
Dept: FAMILY MEDICINE CLINIC | Age: 61
End: 2021-07-06
Payer: MEDICARE

## 2021-07-06 VITALS
WEIGHT: 131 LBS | SYSTOLIC BLOOD PRESSURE: 118 MMHG | TEMPERATURE: 97.6 F | HEART RATE: 86 BPM | BODY MASS INDEX: 19.85 KG/M2 | HEIGHT: 68 IN | DIASTOLIC BLOOD PRESSURE: 62 MMHG | OXYGEN SATURATION: 95 %

## 2021-07-06 VITALS
OXYGEN SATURATION: 97 % | DIASTOLIC BLOOD PRESSURE: 60 MMHG | HEIGHT: 69 IN | SYSTOLIC BLOOD PRESSURE: 110 MMHG | TEMPERATURE: 98 F | BODY MASS INDEX: 22.22 KG/M2 | WEIGHT: 150 LBS | HEART RATE: 81 BPM

## 2021-07-06 DIAGNOSIS — K21.9 GASTROESOPHAGEAL REFLUX DISEASE, UNSPECIFIED WHETHER ESOPHAGITIS PRESENT: ICD-10-CM

## 2021-07-06 DIAGNOSIS — M47.22 CERVICAL RADICULOPATHY DUE TO DEGENERATIVE JOINT DISEASE OF SPINE: ICD-10-CM

## 2021-07-06 DIAGNOSIS — G56.22 ULNAR NEUROPATHY AT ELBOW OF LEFT UPPER EXTREMITY: ICD-10-CM

## 2021-07-06 DIAGNOSIS — G44.209 TENSION VASCULAR HEADACHE: ICD-10-CM

## 2021-07-06 DIAGNOSIS — F41.9 ANXIETY: ICD-10-CM

## 2021-07-06 DIAGNOSIS — G43.109 MIGRAINE WITH AURA AND WITHOUT STATUS MIGRAINOSUS, NOT INTRACTABLE: ICD-10-CM

## 2021-07-06 DIAGNOSIS — G56.21 ULNAR NEUROPATHY AT ELBOW OF RIGHT UPPER EXTREMITY: ICD-10-CM

## 2021-07-06 DIAGNOSIS — F32.A DEPRESSION, UNSPECIFIED DEPRESSION TYPE: ICD-10-CM

## 2021-07-06 DIAGNOSIS — R63.4 UNINTENTIONAL WEIGHT LOSS: Primary | ICD-10-CM

## 2021-07-06 PROBLEM — E78.2 MIXED HYPERLIPIDEMIA: Status: ACTIVE | Noted: 2018-01-29

## 2021-07-06 PROBLEM — R20.2 PARESTHESIA OF BOTH FEET: Status: ACTIVE | Noted: 2019-08-27

## 2021-07-06 PROBLEM — E55.9 VITAMIN D DEFICIENCY: Status: ACTIVE | Noted: 2019-02-28

## 2021-07-06 PROBLEM — G57.00 PIRIFORMIS SYNDROME: Status: ACTIVE | Noted: 2018-10-25

## 2021-07-06 PROCEDURE — 3017F COLORECTAL CA SCREEN DOC REV: CPT | Performed by: STUDENT IN AN ORGANIZED HEALTH CARE EDUCATION/TRAINING PROGRAM

## 2021-07-06 PROCEDURE — 99214 OFFICE O/P EST MOD 30 MIN: CPT | Performed by: STUDENT IN AN ORGANIZED HEALTH CARE EDUCATION/TRAINING PROGRAM

## 2021-07-06 PROCEDURE — G8432 DEP SCR NOT DOC, RNG: HCPCS | Performed by: STUDENT IN AN ORGANIZED HEALTH CARE EDUCATION/TRAINING PROGRAM

## 2021-07-06 PROCEDURE — G8427 DOCREV CUR MEDS BY ELIG CLIN: HCPCS | Performed by: STUDENT IN AN ORGANIZED HEALTH CARE EDUCATION/TRAINING PROGRAM

## 2021-07-06 PROCEDURE — G8420 CALC BMI NORM PARAMETERS: HCPCS | Performed by: STUDENT IN AN ORGANIZED HEALTH CARE EDUCATION/TRAINING PROGRAM

## 2021-07-06 RX ORDER — BUPROPION HYDROCHLORIDE 150 MG/1
150 TABLET ORAL DAILY
Qty: 30 TABLET | Refills: 0 | Status: SHIPPED | OUTPATIENT
Start: 2021-07-06 | End: 2021-08-06 | Stop reason: SDUPTHER

## 2021-07-06 RX ORDER — PANTOPRAZOLE SODIUM 40 MG/1
40 TABLET, DELAYED RELEASE ORAL DAILY
Qty: 90 TABLET | Refills: 1 | Status: SHIPPED | OUTPATIENT
Start: 2021-07-06 | End: 2021-08-06 | Stop reason: DRUGHIGH

## 2021-07-06 RX ORDER — BUPROPION HYDROCHLORIDE 150 MG/1
TABLET ORAL
Qty: 90 TABLET | Refills: 0 | Status: SHIPPED | OUTPATIENT
Start: 2021-07-06 | End: 2021-07-06 | Stop reason: SDUPTHER

## 2021-07-06 NOTE — PROGRESS NOTES
Chief Complaint   Patient presents with    Follow Up Chronic Condition     Insomnia, Weightloss, Restarting Wellbutrin, Restarting Dexliant    Labs     1. Have you been to the ER, urgent care clinic since your last visit? Hospitalized since your last visit? No    2. Have you seen or consulted any other health care providers outside of the [address] [place] [address] since your last visit? Include any pap smears or colon screening.  No

## 2021-07-06 NOTE — PATIENT INSTRUCTIONS
For difficulty sleeping please increase trazodone to 100 mg nightly. This would be 2 tablets. After about 2 weeks if you continue having difficulty sleeping he can increase to 150 mg which would be 3 tablets. When you need a refill please call. Wellbutrin restarted. Protonix ordered for GERD. Please take in the morning about half an hour before eating any meals. Please take by itself with a sip of water.

## 2021-07-07 LAB
ALBUMIN SERPL-MCNC: 4.1 G/DL (ref 3.8–4.8)
ALBUMIN/GLOB SERPL: 2 {RATIO} (ref 1.2–2.2)
ALP SERPL-CCNC: 55 IU/L (ref 48–121)
ALT SERPL-CCNC: 15 IU/L (ref 0–32)
AST SERPL-CCNC: 19 IU/L (ref 0–40)
BASOPHILS # BLD AUTO: 0 X10E3/UL (ref 0–0.2)
BASOPHILS NFR BLD AUTO: 1 %
BILIRUB SERPL-MCNC: 0.4 MG/DL (ref 0–1.2)
BUN SERPL-MCNC: 13 MG/DL (ref 8–27)
BUN/CREAT SERPL: 18 (ref 12–28)
CALCIUM SERPL-MCNC: 9.4 MG/DL (ref 8.7–10.3)
CHLORIDE SERPL-SCNC: 104 MMOL/L (ref 96–106)
CO2 SERPL-SCNC: 24 MMOL/L (ref 20–29)
CREAT SERPL-MCNC: 0.73 MG/DL (ref 0.57–1)
CRP SERPL-MCNC: <1 MG/L (ref 0–10)
EOSINOPHIL # BLD AUTO: 0.1 X10E3/UL (ref 0–0.4)
EOSINOPHIL NFR BLD AUTO: 1 %
ERYTHROCYTE [DISTWIDTH] IN BLOOD BY AUTOMATED COUNT: 12.4 % (ref 11.7–15.4)
ERYTHROCYTE [SEDIMENTATION RATE] IN BLOOD BY WESTERGREN METHOD: 8 MM/HR (ref 0–40)
GLOBULIN SER CALC-MCNC: 2.1 G/DL (ref 1.5–4.5)
GLUCOSE SERPL-MCNC: 102 MG/DL (ref 65–99)
HAV IGM SERPL QL IA: NEGATIVE
HBV CORE IGM SERPL QL IA: NEGATIVE
HBV SURFACE AG SERPL QL IA: NEGATIVE
HCT VFR BLD AUTO: 40.6 % (ref 34–46.6)
HCV AB S/CO SERPL IA: <0.1 S/CO RATIO (ref 0–0.9)
HGB BLD-MCNC: 13.4 G/DL (ref 11.1–15.9)
HIV 1+2 AB+HIV1 P24 AG SERPL QL IA: NON REACTIVE
IMM GRANULOCYTES # BLD AUTO: 0 X10E3/UL (ref 0–0.1)
IMM GRANULOCYTES NFR BLD AUTO: 0 %
LYMPHOCYTES # BLD AUTO: 1.3 X10E3/UL (ref 0.7–3.1)
LYMPHOCYTES NFR BLD AUTO: 31 %
MCH RBC QN AUTO: 29.7 PG (ref 26.6–33)
MCHC RBC AUTO-ENTMCNC: 33 G/DL (ref 31.5–35.7)
MCV RBC AUTO: 90 FL (ref 79–97)
MONOCYTES # BLD AUTO: 0.3 X10E3/UL (ref 0.1–0.9)
MONOCYTES NFR BLD AUTO: 7 %
NEUTROPHILS # BLD AUTO: 2.5 X10E3/UL (ref 1.4–7)
NEUTROPHILS NFR BLD AUTO: 60 %
PLATELET # BLD AUTO: 232 X10E3/UL (ref 150–450)
POTASSIUM SERPL-SCNC: 4.2 MMOL/L (ref 3.5–5.2)
PROT SERPL-MCNC: 6.2 G/DL (ref 6–8.5)
RBC # BLD AUTO: 4.51 X10E6/UL (ref 3.77–5.28)
SODIUM SERPL-SCNC: 140 MMOL/L (ref 134–144)
TSH SERPL DL<=0.005 MIU/L-ACNC: 1.63 UIU/ML (ref 0.45–4.5)
WBC # BLD AUTO: 4.1 X10E3/UL (ref 3.4–10.8)

## 2021-07-12 NOTE — PROGRESS NOTES
Results reviewed with pt, she stated that she will keep this in mind and will call to schedule an appointment.

## 2021-08-03 PROBLEM — F32.A DEPRESSION: Status: RESOLVED | Noted: 2021-07-06 | Resolved: 2021-08-03

## 2021-08-06 ENCOUNTER — OFFICE VISIT (OUTPATIENT)
Dept: FAMILY MEDICINE CLINIC | Age: 61
End: 2021-08-06
Payer: MEDICARE

## 2021-08-06 VITALS
SYSTOLIC BLOOD PRESSURE: 108 MMHG | WEIGHT: 135.8 LBS | BODY MASS INDEX: 20.58 KG/M2 | DIASTOLIC BLOOD PRESSURE: 68 MMHG | OXYGEN SATURATION: 97 % | HEIGHT: 68 IN | TEMPERATURE: 97.3 F | HEART RATE: 76 BPM

## 2021-08-06 DIAGNOSIS — F42.9 OBSESSIVE-COMPULSIVE DISORDER, UNSPECIFIED TYPE: ICD-10-CM

## 2021-08-06 DIAGNOSIS — F39 MOOD DISORDER (HCC): ICD-10-CM

## 2021-08-06 DIAGNOSIS — R63.4 UNINTENTIONAL WEIGHT LOSS: ICD-10-CM

## 2021-08-06 DIAGNOSIS — F32.A DEPRESSION, UNSPECIFIED DEPRESSION TYPE: ICD-10-CM

## 2021-08-06 DIAGNOSIS — F99 INSOMNIA DUE TO OTHER MENTAL DISORDER: ICD-10-CM

## 2021-08-06 DIAGNOSIS — K21.9 GASTROESOPHAGEAL REFLUX DISEASE, UNSPECIFIED WHETHER ESOPHAGITIS PRESENT: Primary | ICD-10-CM

## 2021-08-06 DIAGNOSIS — F41.9 ANXIETY: ICD-10-CM

## 2021-08-06 DIAGNOSIS — F51.05 INSOMNIA DUE TO OTHER MENTAL DISORDER: ICD-10-CM

## 2021-08-06 PROCEDURE — 99214 OFFICE O/P EST MOD 30 MIN: CPT | Performed by: STUDENT IN AN ORGANIZED HEALTH CARE EDUCATION/TRAINING PROGRAM

## 2021-08-06 PROCEDURE — G9717 DOC PT DX DEP/BP F/U NT REQ: HCPCS | Performed by: STUDENT IN AN ORGANIZED HEALTH CARE EDUCATION/TRAINING PROGRAM

## 2021-08-06 PROCEDURE — G8427 DOCREV CUR MEDS BY ELIG CLIN: HCPCS | Performed by: STUDENT IN AN ORGANIZED HEALTH CARE EDUCATION/TRAINING PROGRAM

## 2021-08-06 PROCEDURE — G8420 CALC BMI NORM PARAMETERS: HCPCS | Performed by: STUDENT IN AN ORGANIZED HEALTH CARE EDUCATION/TRAINING PROGRAM

## 2021-08-06 PROCEDURE — 3017F COLORECTAL CA SCREEN DOC REV: CPT | Performed by: STUDENT IN AN ORGANIZED HEALTH CARE EDUCATION/TRAINING PROGRAM

## 2021-08-06 RX ORDER — TRAZODONE HYDROCHLORIDE 100 MG/1
200 TABLET ORAL
Qty: 60 TABLET | Refills: 1 | Status: SHIPPED | OUTPATIENT
Start: 2021-08-06 | End: 2021-09-17

## 2021-08-06 RX ORDER — BUPROPION HYDROCHLORIDE 150 MG/1
150 TABLET ORAL DAILY
Qty: 90 TABLET | Refills: 1 | Status: SHIPPED | OUTPATIENT
Start: 2021-08-06

## 2021-08-06 RX ORDER — DEXLANSOPRAZOLE 60 MG/1
1 CAPSULE, DELAYED RELEASE ORAL EVERY MORNING
Qty: 90 CAPSULE | Refills: 1 | Status: SHIPPED | OUTPATIENT
Start: 2021-08-06

## 2021-08-06 NOTE — PROGRESS NOTES
Chief Complaint   Patient presents with    Follow Up Chronic Condition     1 mo f/u for insomnia, anxiety, depression and weight loss  (PT STATES THAT PROTONIX AND TRAZODONE NOT WORKING)     1. Have you been to the ER, urgent care clinic since your last visit? Hospitalized since your last visit? No    2. Have you seen or consulted any other health care providers outside of the [address] [place] [address] since your last visit? Include any pap smears or colon screening.  No

## 2021-08-06 NOTE — PROGRESS NOTES
Subjective:     Chief Complaint   Patient presents with    Follow Up Chronic Condition     1 mo f/u for insomnia, anxiety, depression and weight loss  (PT STATES THAT PROTONIX AND TRAZODONE NOT WORKING)     HPI:  Alejandro Carrasquillo is a 64 y.o. female who is here for follow-up of GERD, and insomnia. States Protonix is not helping for her GERD. Like to try Alverna Bending as she has taken this in the past and helped. For insomnia she is currently on trazodone 100 mg nightly and it is not helping. She was originally on 50 mg nightly and titrate up to 100 mg nightly. Patient originally had complained about weight loss when she first came to this office about 6 months ago. She is currently doing well she gained 4 pounds since her most recent visit. She has history of depression, anxiety and OCD. Needs a new psychiatrist consulted. She was recently restarted on Lexapro. Her Wellbutrin has been reordered as well but for some reason was never given to her by the pharmacy. Will reorder today.   Past Medical History:   Diagnosis Date    Anxiety     Chronic mental illness     Gastroesophageal reflux disease     Lumbar back pain with radiculopathy affecting left lower extremity     Lumbar back pain with radiculopathy affecting right lower extremity     Major depressive disorder, single episode, unspecified     Migraine     Mixed hyperlipidemia 1/29/2018    Mood disorder ([address]     Obsessive compulsive disorder     Paresthesia of both feet 8/27/2019    Piriformis syndrome 10/25/2018    Vertigo     Vitamin D deficiency 2/28/2019     Family History   Problem Relation Age of Onset    Cancer Mother         lung    High Cholesterol Mother     Hypertension Mother     Dementia Father     Heart Disease Brother      Social History     Socioeconomic History    Marital status: SINGLE     Spouse name: Not on file    Number of children: Not on file    Years of education: Not on file    Highest education level: Not on file   Occupational History    Not on file   Tobacco Use    Smoking status: Never Smoker    Smokeless tobacco: Never Used   Vaping Use    Vaping Use: Never used   Substance and Sexual Activity    Alcohol use: No    Drug use: No    Sexual activity: Not on file   Other Topics Concern    Not on file   Social History Narrative    Not on file     Social Determinants of Health     Financial Resource Strain:     Difficulty of Paying Living Expenses:    Food Insecurity:     Worried About Running Out of Food in the Last Year:     Ran Out of Food in the Last Year:    Transportation Needs:     Lack of Transportation (Medical):  Lack of Transportation (Non-Medical):    Physical Activity:     Days of Exercise per Week:     Minutes of Exercise per Session:    Stress:     Feeling of Stress :    Social Connections:     Frequency of Communication with Friends and Family:     Frequency of Social Gatherings with Friends and Family:     Attends Church Services:     Active Member of Clubs or Organizations:     Attends Club or Organization Meetings:     Marital Status:    Intimate Partner Violence:     Fear of Current or Ex-Partner:     Emotionally Abused:     Physically Abused:     Sexually Abused:      Current Outpatient Medications on File Prior to Visit   Medication Sig Dispense Refill    gabapentin (NEURONTIN) 400 mg capsule Take 1 Capsule by mouth two (2) times a day. Max Daily Amount: 800 mg. 180 Capsule 0    escitalopram oxalate (LEXAPRO) 20 mg tablet escitalopram 20 mg tablet daily 90 Tablet 0    [DISCONTINUED] pantoprazole (PROTONIX) 40 mg tablet Take 1 Tablet by mouth daily. 90 Tablet 1    [DISCONTINUED] buPROPion XL (WELLBUTRIN XL) 150 mg tablet Take 1 Tablet by mouth daily. (Patient not taking: Reported on 8/6/2021) 30 Tablet 0    [DISCONTINUED] traZODone (DESYREL) 50 mg tablet Take 1 Tablet by mouth nightly.  90 Tablet 0     No current facility-administered medications on file prior to visit. Allergies   Allergen Reactions    Clindamycin Hcl Diarrhea and Other (comments)     C-diff    Penicillins Other (comments)     Body aches    Prednisone Other (comments)     hyper    Sulfa (Sulfonamide Antibiotics) Unknown (comments)     Review of Systems   Constitutional: Negative for fever. Psychiatric/Behavioral: The patient has insomnia. Objective:     Vitals:    08/06/21 1107   BP: 108/68   Pulse: 76   Temp: 97.3 °F (36.3 °C)   TempSrc: Temporal   SpO2: 97%   Weight: 135 lb 12.8 oz (61.6 kg)   Height: 5' 8\" (1.727 m)     Physical Exam  Vitals reviewed. Constitutional:       Appearance: Normal appearance. HENT:      Head: Normocephalic and atraumatic. Cardiovascular:      Rate and Rhythm: Normal rate. Pulmonary:      Effort: Pulmonary effort is normal.   Neurological:      Mental Status: She is alert and oriented to person, place, and time. Psychiatric:         Behavior: Behavior normal.            Assessment/Plan:         1. Gastroesophageal reflux disease, unspecified whether esophagitis present        -      Protonix is ineffective. Will restart on previous dose of Dexilant.  -     dexlansoprazole (Dexilant) 60 mg CpDB capsule (delayed release); Take 1 Capsule by mouth Every morning. 2. Insomnia due to other mental disorder        -     Continues to have insomnia. Likely due to underlying psychiatric issues. Will consult psychiatry, and will also increase trazodone to 200 mg nightly. If trazodone is ineffective we will try Remeron, and if that does not work then we can just go ahead and restart her on Ambien.  -     traZODone (DESYREL) 100 mg tablet; Take 2 Tablets by mouth nightly. 3. OCD  4. Anxiety  5. Depression, unspecified depression type        -     Continue Lexapro, will reorder Wellbutrin. Psychiatry consulted. -     buPROPion XL (WELLBUTRIN XL) 150 mg tablet; Take 1 Tablet by mouth daily. -   Referral to Psychiatry    6.  Unintentional weight loss  -Weight loss has improved, as she actually gained 4 pounds since last visit. She was praised. Continue to monitor. Follow-Up:  Follow-up and Dispositions    · Return in about 6 weeks (around 9/17/2021).           Martha Reinoso MD

## 2021-09-17 ENCOUNTER — OFFICE VISIT (OUTPATIENT)
Dept: FAMILY MEDICINE CLINIC | Age: 61
End: 2021-09-17
Payer: MEDICARE

## 2021-09-17 VITALS
TEMPERATURE: 97.8 F | BODY MASS INDEX: 20.61 KG/M2 | HEIGHT: 68 IN | OXYGEN SATURATION: 97 % | HEART RATE: 86 BPM | SYSTOLIC BLOOD PRESSURE: 118 MMHG | WEIGHT: 136 LBS | DIASTOLIC BLOOD PRESSURE: 78 MMHG

## 2021-09-17 DIAGNOSIS — H61.22 IMPACTED CERUMEN OF LEFT EAR: ICD-10-CM

## 2021-09-17 DIAGNOSIS — F32.A DEPRESSION, UNSPECIFIED DEPRESSION TYPE: ICD-10-CM

## 2021-09-17 DIAGNOSIS — G47.00 INSOMNIA, UNSPECIFIED TYPE: ICD-10-CM

## 2021-09-17 DIAGNOSIS — F32.9 MAJOR DEPRESSIVE DISORDER WITH SINGLE EPISODE, REMISSION STATUS UNSPECIFIED: ICD-10-CM

## 2021-09-17 DIAGNOSIS — F41.9 ANXIETY: Primary | ICD-10-CM

## 2021-09-17 DIAGNOSIS — F39 MOOD DISORDER (HCC): ICD-10-CM

## 2021-09-17 DIAGNOSIS — T78.40XA ALLERGY, INITIAL ENCOUNTER: ICD-10-CM

## 2021-09-17 PROCEDURE — 3017F COLORECTAL CA SCREEN DOC REV: CPT | Performed by: STUDENT IN AN ORGANIZED HEALTH CARE EDUCATION/TRAINING PROGRAM

## 2021-09-17 PROCEDURE — G8420 CALC BMI NORM PARAMETERS: HCPCS | Performed by: STUDENT IN AN ORGANIZED HEALTH CARE EDUCATION/TRAINING PROGRAM

## 2021-09-17 PROCEDURE — 99214 OFFICE O/P EST MOD 30 MIN: CPT | Performed by: STUDENT IN AN ORGANIZED HEALTH CARE EDUCATION/TRAINING PROGRAM

## 2021-09-17 PROCEDURE — G9717 DOC PT DX DEP/BP F/U NT REQ: HCPCS | Performed by: STUDENT IN AN ORGANIZED HEALTH CARE EDUCATION/TRAINING PROGRAM

## 2021-09-17 PROCEDURE — 69209 REMOVE IMPACTED EAR WAX UNI: CPT | Performed by: STUDENT IN AN ORGANIZED HEALTH CARE EDUCATION/TRAINING PROGRAM

## 2021-09-17 PROCEDURE — G8427 DOCREV CUR MEDS BY ELIG CLIN: HCPCS | Performed by: STUDENT IN AN ORGANIZED HEALTH CARE EDUCATION/TRAINING PROGRAM

## 2021-09-17 RX ORDER — ZOLPIDEM TARTRATE 10 MG/1
10 TABLET ORAL
Qty: 30 TABLET | Refills: 0 | Status: SHIPPED | OUTPATIENT
Start: 2021-09-17

## 2021-09-17 RX ORDER — ESCITALOPRAM OXALATE 20 MG/1
TABLET ORAL
Qty: 90 TABLET | Refills: 1 | Status: SHIPPED | OUTPATIENT
Start: 2021-09-17

## 2021-09-17 RX ORDER — CETIRIZINE HCL 10 MG
10 TABLET ORAL DAILY
Qty: 90 TABLET | Refills: 1 | Status: SHIPPED | OUTPATIENT
Start: 2021-09-17

## 2021-09-17 NOTE — PROGRESS NOTES
Subjective:     Chief Complaint   Patient presents with    Follow-up     6 week f/u on medications patient states that the trazodone didnt help her sleep at all     Medication Refill     patient needs a refill on her Escitalopram     Other     patient also has thumping in left ear no pain and would like it checked and nose running and eyes      HPI:  Anthony Love is a 64 y.o. female who presents with multiple complaints. Insomniahere for follow-up of insomnia. Trazodone has not helped. She had in the past been on Ambien 10 mg and alprazolam 1 mg. This had been ordered by her previous psychiatrist.    Patient complains of weird swishing noise in her left ear. No pain. Wants Lexapro refilled. Denies side effects. Patient complains of allergies including congestion. Symptoms are worse when she cuts the grass. Nothing any medication for allergies.           Cerumen impaction left side  HPI  Past Medical History:   Diagnosis Date    Anxiety     Chronic mental illness     Gastroesophageal reflux disease     Lumbar back pain with radiculopathy affecting left lower extremity     Lumbar back pain with radiculopathy affecting right lower extremity     Major depressive disorder, single episode, unspecified     Migraine     Mixed hyperlipidemia 1/29/2018    Mood disorder ([facility] [address]     Obsessive compulsive disorder     Paresthesia of both feet 8/27/2019    Piriformis syndrome 10/25/2018    Vertigo     Vitamin D deficiency 2/28/2019     Family History   Problem Relation Age of Onset    Cancer Mother         lung    High Cholesterol Mother     Hypertension Mother     Dementia Father     Heart Disease Brother      Social History     Socioeconomic History    Marital status: SINGLE     Spouse name: Not on file    Number of children: Not on file    Years of education: Not on file    Highest education level: Not on file   Occupational History    Not on file   Tobacco Use    Smoking status: Never Smoker    Smokeless tobacco: Never Used   Vaping Use    Vaping Use: Never used   Substance and Sexual Activity    Alcohol use: No    Drug use: No    Sexual activity: Not on file   Other Topics Concern    Not on file   Social History Narrative    Not on file     Social Determinants of Health     Financial Resource Strain:     Difficulty of Paying Living Expenses:    Food Insecurity:     Worried About Running Out of Food in the Last Year:     [address] in the Last Year:    Transportation Needs:     Lack of Transportation (Medical):  Lack of Transportation (Non-Medical):    Physical Activity:     Days of Exercise per Week:     Minutes of Exercise per Session:    Stress:     Feeling of Stress :    Social Connections:     Frequency of Communication with Friends and Family:     Frequency of Social Gatherings with Friends and Family:     Attends Yarsanism Services:     Active Member of Clubs or Organizations:     Attends Club or Organization Meetings:     Marital Status:    Intimate Partner Violence:     Fear of Current or Ex-Partner:     Emotionally Abused:     Physically Abused:     Sexually Abused:      Current Outpatient Medications on File Prior to Visit   Medication Sig Dispense Refill    dexlansoprazole (Dexilant) 60 mg CpDB capsule (delayed release) Take 1 Capsule by mouth Every morning. 90 Capsule 1    buPROPion XL (WELLBUTRIN XL) 150 mg tablet Take 1 Tablet by mouth daily. 90 Tablet 1    gabapentin (NEURONTIN) 400 mg capsule Take 1 Capsule by mouth two (2) times a day. Max Daily Amount: 800 mg. 180 Capsule 0     No current facility-administered medications on file prior to visit.      Allergies   Allergen Reactions    Clindamycin Hcl Diarrhea and Other (comments)     C-diff    Penicillins Other (comments)     Body aches    Prednisone Other (comments)     hyper    Sulfa (Sulfonamide Antibiotics) Unknown (comments)     Review of Systems   All other systems reviewed and are negative. Objective:     Vitals:    09/17/21 1120   BP: 118/78   Pulse: 86   Temp: 97.8 °F (36.6 °C)   TempSrc: Temporal   SpO2: 97%   Weight: 136 lb (61.7 kg)   Height: 5' 8\" (1.727 m)     Physical Exam  Vitals reviewed. Constitutional:       Appearance: Normal appearance. HENT:      Head: Normocephalic and atraumatic. Right Ear: Ear canal normal. Tympanic membrane is erythematous. Left Ear: There is impacted cerumen. Ears:      Comments: Left tympanic membraneafter cerumen was removed on tympanic membrane the left side was normal.  Cardiovascular:      Rate and Rhythm: Normal rate. Pulmonary:      Effort: Pulmonary effort is normal.   Neurological:      Mental Status: She is alert and oriented to person, place, and time. Psychiatric:         Behavior: Behavior normal.            Assessment/Plan:       ICD-10-CM ICD-9-CM    1. Anxiety  F41.9 300.00 REFERRAL TO PSYCHIATRY      escitalopram oxalate (LEXAPRO) 20 mg tablet   2. Major depressive disorder with single episode, remission status unspecified  F32.9 296.20 REFERRAL TO PSYCHIATRY   3. Mood disorder (HCC)  F39 296.90 REFERRAL TO PSYCHIATRY   4. Insomnia, unspecified type  G47.00 780.52 zolpidem (AMBIEN) 10 mg tablet   5. Allergy, initial encounter  T78.40XA 995.3 cetirizine (ZYRTEC) 10 mg tablet   6. Depression, unspecified depression type  F32.9 311 escitalopram oxalate (LEXAPRO) 20 mg tablet       Insomniatrazodone has been ineffective. Will reorder Ambien for does help in the past.  She was previously on Ambien and alprazolam.  Psychiatry consulted. Depression/anxietyLexapro refilled. Psychiatrist consulted. AllergyZyrtec ordered. Left-sided cerumen impactionsuccessful cerumen irrigation  done in the office today. Note below.       Procedure Note:  After informed consent was discussed and obtained, under direct visualization, cerumen was removed from the ear canal(s) which was obstructing the visualization of the tympanic membrane. Using peroxide flushing and cerumen curettes in areas needed, the cerumen was completely removed. The external canal was healthy upon completion a small amount of irritation was noted no bruising or laceration noted. Follow-Up:  Follow-up and Dispositions    · Return in about 1 month (around 10/17/2021) for Insomnia.           Kala Buchanan MD

## 2021-09-17 NOTE — PROGRESS NOTES
Chief Complaint   Patient presents with    Follow-up     6 week f/u on medications patient states that the trazodone didnt help her sleep at all     Medication Refill     patient needs a refill on her Escitalopram     Other     patient also has thumping in left ear no pain and would like it checked and nose running and eyes      1. Have you been to the ER, urgent care clinic since your last visit? Hospitalized since your last visit? No    2. Have you seen or consulted any other health care providers outside of the [address] [place] [address] since your last visit? Include any pap smears or colon screening.  No

## 2021-09-27 DIAGNOSIS — Z98.890 HISTORY OF LUMBAR LAMINECTOMY: ICD-10-CM

## 2021-09-27 DIAGNOSIS — G56.22 ULNAR NEUROPATHY AT ELBOW OF LEFT UPPER EXTREMITY: ICD-10-CM

## 2021-09-27 DIAGNOSIS — M54.16 LUMBAR BACK PAIN WITH RADICULOPATHY AFFECTING RIGHT LOWER EXTREMITY: ICD-10-CM

## 2021-09-27 DIAGNOSIS — M54.16 LUMBAR BACK PAIN WITH RADICULOPATHY AFFECTING LEFT LOWER EXTREMITY: ICD-10-CM

## 2021-09-27 DIAGNOSIS — G43.109 MIGRAINE WITH AURA AND WITHOUT STATUS MIGRAINOSUS, NOT INTRACTABLE: ICD-10-CM

## 2021-09-27 DIAGNOSIS — M79.7 FIBROMYALGIA: ICD-10-CM

## 2021-09-27 DIAGNOSIS — M77.42 METATARSALGIA OF BOTH FEET: ICD-10-CM

## 2021-09-27 DIAGNOSIS — M77.41 METATARSALGIA OF BOTH FEET: ICD-10-CM

## 2021-09-27 DIAGNOSIS — G44.209 TENSION VASCULAR HEADACHE: ICD-10-CM

## 2021-09-27 DIAGNOSIS — M47.22 CERVICAL RADICULOPATHY DUE TO DEGENERATIVE JOINT DISEASE OF SPINE: ICD-10-CM

## 2021-09-27 DIAGNOSIS — G56.21 ULNAR NEUROPATHY AT ELBOW OF RIGHT UPPER EXTREMITY: ICD-10-CM

## 2021-09-29 RX ORDER — GABAPENTIN 400 MG/1
400 CAPSULE ORAL 2 TIMES DAILY
Qty: 180 CAPSULE | Refills: 1 | Status: SHIPPED | OUTPATIENT
Start: 2021-09-29

## 2021-10-21 ENCOUNTER — OFFICE VISIT (OUTPATIENT)
Dept: FAMILY MEDICINE CLINIC | Age: 61
End: 2021-10-21
Payer: MEDICARE

## 2021-10-21 VITALS
OXYGEN SATURATION: 97 % | SYSTOLIC BLOOD PRESSURE: 118 MMHG | WEIGHT: 134.2 LBS | TEMPERATURE: 97.3 F | RESPIRATION RATE: 16 BRPM | DIASTOLIC BLOOD PRESSURE: 60 MMHG | HEIGHT: 68 IN | BODY MASS INDEX: 20.34 KG/M2 | HEART RATE: 84 BPM

## 2021-10-21 DIAGNOSIS — Z13.31 POSITIVE DEPRESSION SCREENING: ICD-10-CM

## 2021-10-21 DIAGNOSIS — Z87.440 HISTORY OF UTI: ICD-10-CM

## 2021-10-21 DIAGNOSIS — F51.05 INSOMNIA DUE TO OTHER MENTAL DISORDER: Primary | ICD-10-CM

## 2021-10-21 DIAGNOSIS — G89.29 CHRONIC RIGHT-SIDED BACK PAIN, UNSPECIFIED BACK LOCATION: ICD-10-CM

## 2021-10-21 DIAGNOSIS — M54.9 CHRONIC RIGHT-SIDED BACK PAIN, UNSPECIFIED BACK LOCATION: ICD-10-CM

## 2021-10-21 DIAGNOSIS — F99 INSOMNIA DUE TO OTHER MENTAL DISORDER: Primary | ICD-10-CM

## 2021-10-21 LAB
BILIRUB UR QL STRIP: NEGATIVE
GLUCOSE UR-MCNC: NEGATIVE MG/DL
KETONES P FAST UR STRIP-MCNC: NORMAL MG/DL
PH UR STRIP: 7.5 [PH] (ref 4.6–8)
PROT UR QL STRIP: NORMAL
SP GR UR STRIP: 1.02 (ref 1–1.03)
UA UROBILINOGEN AMB POC: NORMAL (ref 0.2–1)
URINALYSIS CLARITY POC: NORMAL
URINALYSIS COLOR POC: NORMAL
URINE BLOOD POC: NEGATIVE
URINE LEUKOCYTES POC: NEGATIVE
URINE NITRITES POC: NEGATIVE

## 2021-10-21 PROCEDURE — 81003 URINALYSIS AUTO W/O SCOPE: CPT | Performed by: STUDENT IN AN ORGANIZED HEALTH CARE EDUCATION/TRAINING PROGRAM

## 2021-10-21 PROCEDURE — G8420 CALC BMI NORM PARAMETERS: HCPCS | Performed by: STUDENT IN AN ORGANIZED HEALTH CARE EDUCATION/TRAINING PROGRAM

## 2021-10-21 PROCEDURE — 99214 OFFICE O/P EST MOD 30 MIN: CPT | Performed by: STUDENT IN AN ORGANIZED HEALTH CARE EDUCATION/TRAINING PROGRAM

## 2021-10-21 PROCEDURE — G8427 DOCREV CUR MEDS BY ELIG CLIN: HCPCS | Performed by: STUDENT IN AN ORGANIZED HEALTH CARE EDUCATION/TRAINING PROGRAM

## 2021-10-21 PROCEDURE — 3017F COLORECTAL CA SCREEN DOC REV: CPT | Performed by: STUDENT IN AN ORGANIZED HEALTH CARE EDUCATION/TRAINING PROGRAM

## 2021-10-21 PROCEDURE — G9717 DOC PT DX DEP/BP F/U NT REQ: HCPCS | Performed by: STUDENT IN AN ORGANIZED HEALTH CARE EDUCATION/TRAINING PROGRAM

## 2021-10-21 RX ORDER — ZALEPLON 10 MG/1
10 CAPSULE ORAL
Qty: 30 CAPSULE | Refills: 1 | Status: SHIPPED | OUTPATIENT
Start: 2021-10-21

## 2021-10-21 NOTE — PROGRESS NOTES
Subjective:     Chief Complaint   Patient presents with    Follow Up Chronic Condition     medication for insominia, none of the meds are really helping, having trouble with urinating states everytime she does her back hurts afterwards. HPI:  Giovani Meyers is a 64 y.o. female following up for insomnia. Patient had been placed on trazodone which did not help. She was placed back on Ambien, and that is not helping either. States that that is strange as Ambien has helped in the past.  Of note she had been on Ambien and a Xanax at the same time previous to have been ordered by her previous psychiatrist.  States she has trouble falling asleep. States she has not been sleeping much at all. She is willing to try Sonata. She has history of depression and anxiety on Lexapro. Has seen psychiatry in the past.  Previous psychiatrist passed away. Psychiatry was consulted but she would like someone closer to her house. States the right side of her back hurts when she urinates. POCT UA was negative. Has had UTIs in the past, but this feels different. She denies feeling any dysuria. Previous UTIs she has there had frequency or difficulty urinating.     Patient Active Problem List    Diagnosis    Anxiety    Major depressive disorder, single episode, unspecified    Gastroesophageal reflux disease    Mood disorder (HCC)    Obsessive compulsive disorder    Vertigo    Metatarsalgia of both feet    Paresthesia of both feet    Vitamin D deficiency    History of lumbar laminectomyx2    Lumbar back pain with radiculopathy affecting left lower extremity    Lumbar back pain with radiculopathy affecting right lower extremity    Piriformis syndrome    Fibromyalgia    Ulnar neuropathy at elbow of left upper extremity    Ulnar neuropathy at elbow of right upper extremity    Cervical radiculopathy due to degenerative joint disease of spine    Tension vascular headache    Migraine with aura and without status migrainosus, not intractable    Mixed hyperlipidemia     Past Medical History:   Diagnosis Date    Anxiety     Chronic mental illness     Gastroesophageal reflux disease     Lumbar back pain with radiculopathy affecting left lower extremity     Lumbar back pain with radiculopathy affecting right lower extremity     Major depressive disorder, single episode, unspecified     Migraine     Mixed hyperlipidemia 1/29/2018    Mood disorder [address] [address]     Obsessive compulsive disorder     Paresthesia of both feet 8/27/2019    Piriformis syndrome 10/25/2018    Vertigo     Vitamin D deficiency 2/28/2019     Family History   Problem Relation Age of Onset    Cancer Mother         lung    High Cholesterol Mother     Hypertension Mother     Dementia Father     Heart Disease Brother      Social History     Socioeconomic History    Marital status: SINGLE     Spouse name: Not on file    Number of children: Not on file    Years of education: Not on file    Highest education level: Not on file   Occupational History    Not on file   Tobacco Use    Smoking status: Never Smoker    Smokeless tobacco: Never Used   Vaping Use    Vaping Use: Never used   Substance and Sexual Activity    Alcohol use: No    Drug use: No    Sexual activity: Not on file   Other Topics Concern    Not on file   Social History Narrative    Not on file     Social Determinants of Health     Financial Resource Strain:     Difficulty of Paying Living Expenses:    Food Insecurity:     Worried About Running Out of Food in the Last Year:     Ran Out of Food in the Last Year:    Transportation Needs:     Lack of Transportation (Medical):      Lack of Transportation (Non-Medical):    Physical Activity:     Days of Exercise per Week:     Minutes of Exercise per Session:    Stress:     Feeling of Stress :    Social Connections:     Frequency of Communication with Friends and Family:     Frequency of Social Gatherings with Friends and Family:     Attends Yarsani Services:     Active Member of Clubs or Organizations:     Attends Club or Organization Meetings:     Marital Status:    Intimate Partner Violence:     Fear of Current or Ex-Partner:     Emotionally Abused:     Physically Abused:     Sexually Abused:      Current Outpatient Medications on File Prior to Visit   Medication Sig Dispense Refill    gabapentin (NEURONTIN) 400 mg capsule Take 1 Capsule by mouth two (2) times a day. Max Daily Amount: 800 mg. 180 Capsule 1    zolpidem (AMBIEN) 10 mg tablet Take 1 Tablet by mouth nightly as needed for Sleep. Max Daily Amount: 10 mg. 30 Tablet 0    cetirizine (ZYRTEC) 10 mg tablet Take 1 Tablet by mouth daily. 90 Tablet 1    escitalopram oxalate (LEXAPRO) 20 mg tablet escitalopram 20 mg tablet daily 90 Tablet 1    dexlansoprazole (Dexilant) 60 mg CpDB capsule (delayed release) Take 1 Capsule by mouth Every morning. 90 Capsule 1    buPROPion XL (WELLBUTRIN XL) 150 mg tablet Take 1 Tablet by mouth daily. 90 Tablet 1     No current facility-administered medications on file prior to visit. Allergies   Allergen Reactions    Clindamycin Hcl Diarrhea and Other (comments)     C-diff    Penicillins Other (comments)     Body aches    Prednisone Other (comments)     hyper    Sulfa (Sulfonamide Antibiotics) Unknown (comments)     Review of Systems   All other systems reviewed and are negative. Objective:     Vitals:    10/21/21 1117   BP: 118/60   Pulse: 84   Resp: 16   Temp: 97.3 °F (36.3 °C)   TempSrc: Temporal   SpO2: 97%   Weight: 134 lb 3.2 oz (60.9 kg)   Height: 5' 8\" (1.727 m)     Physical Exam  Vitals reviewed. Constitutional:       Appearance: Normal appearance. HENT:      Head: Normocephalic and atraumatic. Cardiovascular:      Rate and Rhythm: Normal rate. Pulmonary:      Effort: Pulmonary effort is normal.   Abdominal:      Tenderness: There is no abdominal tenderness.  There is no right CVA tenderness or left CVA tenderness. Neurological:      Mental Status: She is alert and oriented to person, place, and time. Psychiatric:         Behavior: Behavior normal.       POCT UA (10/21/2021)      AMB POC URINALYSIS DIP STICK AUTO W/O MICRO  Order: [id number]  Collected:  10/21/2021 11:20   Status:  Final result   0 Result Notes   Ref Range & Units 10/21/21 1120   Color (UA POC)  Margareth    Clarity (UA POC)  Cloudy    Glucose (UA POC) Negative Negative    Bilirubin (UA POC) Negative Negative    Ketones (UA POC) Negative Trace    Specific gravity (UA POC) 1.001 - 1.035 1.020    Blood (UA POC) Negative Negative    pH (UA POC) 4.6 - 8.0 7.5    Protein (UA POC) Negative 3+    Urobilinogen (UA POC) 0.2 - 1 0.2 mg/dL    Nitrites (UA POC) Negative Negative    Leukocyte esterase (UA POC) Negative Negative                      Assessment/Plan:       ICD-10-CM ICD-9-CM    1. Insomnia due to other mental disorder  F51.05 300.9 zaleplon (SONATA) 10 mg capsule    F99 327.02    2. Positive depression screening  Z13.31 796.4    3. Chronic right-sided back pain, unspecified back location  M54.9 724.5 URINALYSIS W/ RFLX MICROSCOPIC    G89.29 338.29 CULTURE, URINE      AMB POC URINALYSIS DIP STICK AUTO W/O MICRO   4. History of UTI  Z87.440 V13.02 URINALYSIS W/ RFLX MICROSCOPIC      CULTURE, URINE      AMB POC URINALYSIS DIP STICK AUTO W/O MICRO     Insomniahas recently failed treatment with trazodone and Ambien. Will try Sonata, CBT. Psychology will be consulted for CBT. Depression/anxiety/OCD-on Zoloft. Would like a psychiatrist closer to her house consulted. Right-sided low back painI suspect this may be MSK. POCT UA unremarkable. Due to history of UTIs we will go ahead and send off. If continues will get x-ray to rule out unlikely stone. Follow-Up:  Follow-up and Dispositions    · Return in about 6 weeks (around 12/2/2021) for Insomnia.           Neil Matute MD

## 2021-10-21 NOTE — PROGRESS NOTES
3 most recent PHQ Screens 10/21/2021   Little interest or pleasure in doing things Nearly every day   Feeling down, depressed, irritable, or hopeless Nearly every day   Total Score PHQ 2 6   Trouble falling or staying asleep, or sleeping too much Nearly every day   Feeling tired or having little energy More than half the days   Poor appetite, weight loss, or overeating More than half the days   Feeling bad about yourself - or that you are a failure or have let yourself or your family down More than half the days   Trouble concentrating on things such as school, work, reading, or watching TV Several days   Moving or speaking so slowly that other people could have noticed; or the opposite being so fidgety that others notice Not at all   Thoughts of being better off dead, or hurting yourself in some way Not at all   PHQ 9 Score 16   How difficult have these problems made it for you to do your work, take care of your home and get along with others Not difficult at all       Chief Complaint   Patient presents with    Follow Up Chronic Condition     medication for insominia, none of the meds are really helping, having trouble with urinating states everytime she does her back hurts afterwards. 1. Have you been to the ER, urgent care clinic since your last visit? Hospitalized since your last visit? No    2. Have you seen or consulted any other health care providers outside of the [address] [place] [address] since your last visit? Include any pap smears or colon screening.  No   Visit Vitals  /60 (BP 1 Location: Left upper arm, BP Patient Position: Sitting, BP Cuff Size: Adult)   Pulse 84   Temp 97.3 °F (36.3 °C) (Temporal)   Resp 16   Ht 5' 8\" (1.727 m)   Wt 134 lb 3.2 oz (60.9 kg)   SpO2 97%   BMI 20.41 kg/m²

## 2021-10-21 NOTE — PATIENT INSTRUCTIONS
Zaleplon (By mouth)   Zaleplon (ZAL-e-plon)  Treats insomnia (having trouble falling asleep). Brand Name(s): Sonata   There may be other brand names for this medicine. When This Medicine Should Not Be Used: You should not use this medicine if you have had an allergic reaction to zaleplon, or if you have severe liver disease. How to Use This Medicine:   Capsule  · Your doctor will tell you how much medicine to use. Do not use more than directed. · Take this medicine just before going to bed or when you will have time to sleep for at least 4 hours. · You should not use this medicine with a high-fat or heavy meal.  · This medicine is not for long-term use. Zaleplon should be used for no more than 10 days unless otherwise ordered by your doctor. · If you have used this medicine every night for more than 1 week, do not suddenly stop using it without first checking with your doctor. You may need to slowly decrease your dose before stopping it completely. · This medicine should come with a Medication Guide. Ask your pharmacist for a copy if you do not have one. If a dose is missed:   · If you do not remember until the next morning that you missed a dose of zaleplon the night before, skip the missed dose. Wait until that night to use your medicine. · You should not use two doses at the same time. How to Store and Dispose of This Medicine:   · Store the medicine in a closed container at room temperature, away from heat, moisture, and direct light. · Ask your pharmacist, doctor, or health caregiver about the best way to dispose of any outdated medicine or medicine no longer needed. · Keep all medicine out of the reach of children. Never share your medicine with anyone. Drugs and Foods to Avoid:   Ask your doctor or pharmacist before using any other medicine, including over-the-counter medicines, vitamins, and herbal products.   · Make sure your doctor knows if you are also using carbamazepine Benny Khanna), cimetidine (Tagamet®), erythromycin (Greg-tab®), imipramine (Tofranil®), ketoconazole (Nizoral®), phenobarbital, phenytoin (Dilantin®), promethazine (Phenergan®), rifampin (Rifadin®), or thioridazine (Mellaril®). · Tell your doctor if you use anything else that makes you sleepy. Some examples are allergy medicine, narcotic pain medicine, and alcohol. · Do not drink alcohol while you are using this medicine. Warnings While Using This Medicine:   · Make sure your doctor knows if you are pregnant or breastfeeding, or if you have mild liver disease, any breathing problems, or a history of depression, alcoholism, or drug abuse. · Zaleplon also may cause temporary changes in your behavior, such as aggression, agitation, confusion, or strange behavior. Contact your doctor if you notice any of these signs while using this medicine. · This medicine may cause a serious type of allergic reaction called anaphylaxis. Anaphylaxis can be life-threatening and requires immediate medical attention. Stop taking this medicine and call your doctor right away if you have itching, hives, trouble breathing, or any swelling of your hands, face, or mouth when you take this medicine. · This medicine may make you dizzy or drowsy. Avoid driving, using machines, or doing anything else that could be dangerous if you are not alert. This medicine may also cause sleep-related behaviors such as driving a car (sleep-driving), walking (sleep-walking), having sex, making phone calls, or preparing and eating food while asleep or not fully awake. If these reactions occur, tell your doctor right away. · Sometimes these effects may last through the next day. Make sure you know how the medicine will affect you before you do any tasks that require you to be alert. · This medicine can be habit-forming. Do not use more than your prescribed dose. Call your doctor if you think your medicine is not working.   · Your doctor will check your progress and the effects of this medicine at regular visits. Keep all appointments. · Call your doctor if your symptoms do not improve or if they get worse. Possible Side Effects While Using This Medicine:   Call your doctor right away if you notice any of these side effects:  · Allergic reaction: Itching or hives, swelling in your face or hands, swelling or tingling in your mouth or throat, chest tightness, trouble breathing  · Changes in behavior or thinking. · Depressed mood or thoughts of suicide. · Diarrhea that may contain blood. · Seeing, hearing, or feeling things that are not there. · Swelling in your hands, ankles, or feet. · Trouble breathing. If you notice these less serious side effects, talk with your doctor:   · Anxiety. · Blurred vision or eye pain. · Dizziness, drowsiness, headache, or lightheadedness. · Loss of appetite. · Loss of memory for the first few hours after using the medicine. · Numbness, tingling, or burning pain in your hands, arms, legs, or feet. · Stomach pain or nausea. · Trouble with coordination or muscle stiffness. If you notice other side effects that you think are caused by this medicine, tell your doctor. Call your doctor for medical advice about side effects. You may report side effects to FDA at 1-238-FDA-6188  © 2017 [facility] Information is for End User's use only and may not be sold, redistributed or otherwise used for commercial purposes. The above information is an  only. It is not intended as medical advice for individual conditions or treatments. Talk to your doctor, nurse or pharmacist before following any medical regimen to see if it is safe and effective for you.

## 2021-10-24 LAB
APPEARANCE UR: ABNORMAL
BACTERIA UR CULT: NORMAL
BILIRUB UR QL STRIP: NEGATIVE
COLOR UR: YELLOW
GLUCOSE UR QL: NEGATIVE
HGB UR QL STRIP: NEGATIVE
KETONES UR QL STRIP: ABNORMAL
LEUKOCYTE ESTERASE UR QL STRIP: NEGATIVE
MICRO URNS: ABNORMAL
NITRITE UR QL STRIP: NEGATIVE
PH UR STRIP: 7.5 [PH] (ref 5–7.5)
PROT UR QL STRIP: NEGATIVE
SP GR UR: 1.02 (ref 1–1.03)
UROBILINOGEN UR STRIP-MCNC: 1 MG/DL (ref 0.2–1)

## 2022-03-19 PROBLEM — M47.22 CERVICAL RADICULOPATHY DUE TO DEGENERATIVE JOINT DISEASE OF SPINE: Status: ACTIVE | Noted: 2018-06-14

## 2022-03-19 PROBLEM — G56.22 ULNAR NEUROPATHY AT ELBOW OF LEFT UPPER EXTREMITY: Status: ACTIVE | Noted: 2018-06-14

## 2022-03-19 PROBLEM — G44.209 TENSION VASCULAR HEADACHE: Status: ACTIVE | Noted: 2018-06-14

## 2022-03-19 PROBLEM — E78.2 MIXED HYPERLIPIDEMIA: Status: ACTIVE | Noted: 2018-01-29

## 2022-03-19 PROBLEM — G43.109 MIGRAINE WITH AURA AND WITHOUT STATUS MIGRAINOSUS, NOT INTRACTABLE: Status: ACTIVE | Noted: 2018-06-14

## 2022-03-19 PROBLEM — G57.00 PIRIFORMIS SYNDROME: Status: ACTIVE | Noted: 2018-10-25

## 2022-03-19 PROBLEM — M79.7 FIBROMYALGIA: Status: ACTIVE | Noted: 2018-07-25

## 2022-03-19 PROBLEM — G56.21 ULNAR NEUROPATHY AT ELBOW OF RIGHT UPPER EXTREMITY: Status: ACTIVE | Noted: 2018-06-14

## 2022-03-19 PROBLEM — R20.2 PARESTHESIA OF BOTH FEET: Status: ACTIVE | Noted: 2019-08-27

## 2022-03-19 PROBLEM — Z98.890 HISTORY OF LUMBAR LAMINECTOMY: Status: ACTIVE | Noted: 2018-11-13

## 2022-03-19 PROBLEM — E55.9 VITAMIN D DEFICIENCY: Status: ACTIVE | Noted: 2019-02-28

## 2022-03-20 PROBLEM — M77.42 METATARSALGIA OF BOTH FEET: Status: ACTIVE | Noted: 2019-10-09

## 2022-03-20 PROBLEM — M77.41 METATARSALGIA OF BOTH FEET: Status: ACTIVE | Noted: 2019-10-09

## 2023-05-19 RX ORDER — ZALEPLON 10 MG/1
10 CAPSULE ORAL
COMMUNITY
Start: 2021-10-21

## 2023-05-19 RX ORDER — ESCITALOPRAM OXALATE 20 MG/1
TABLET ORAL
COMMUNITY
Start: 2021-09-17

## 2023-05-19 RX ORDER — ZOLPIDEM TARTRATE 10 MG/1
10 TABLET ORAL
COMMUNITY
Start: 2021-09-17

## 2023-05-19 RX ORDER — CETIRIZINE HYDROCHLORIDE 10 MG/1
10 TABLET ORAL DAILY
COMMUNITY
Start: 2021-09-17

## 2023-05-19 RX ORDER — GABAPENTIN 400 MG/1
400 CAPSULE ORAL 2 TIMES DAILY
COMMUNITY
Start: 2021-09-29

## 2023-05-19 RX ORDER — DEXLANSOPRAZOLE 60 MG/1
60 CAPSULE, DELAYED RELEASE ORAL EVERY MORNING
COMMUNITY
Start: 2021-08-06

## 2023-05-19 RX ORDER — BUPROPION HYDROCHLORIDE 150 MG/1
150 TABLET ORAL DAILY
COMMUNITY
Start: 2021-08-06